# Patient Record
Sex: FEMALE | Race: WHITE | NOT HISPANIC OR LATINO
[De-identification: names, ages, dates, MRNs, and addresses within clinical notes are randomized per-mention and may not be internally consistent; named-entity substitution may affect disease eponyms.]

---

## 2018-09-10 ENCOUNTER — CHARTING TRANS (OUTPATIENT)
Dept: OTHER | Age: 28
End: 2018-09-10

## 2018-12-08 VITALS
WEIGHT: 185.85 LBS | DIASTOLIC BLOOD PRESSURE: 78 MMHG | TEMPERATURE: 98.9 F | BODY MASS INDEX: 35.09 KG/M2 | HEART RATE: 98 BPM | HEIGHT: 61 IN | SYSTOLIC BLOOD PRESSURE: 118 MMHG | RESPIRATION RATE: 18 BRPM

## 2019-08-09 ENCOUNTER — APPOINTMENT (OUTPATIENT)
Dept: OTHER | Facility: HOSPITAL | Age: 29
End: 2019-08-09
Attending: PREVENTIVE MEDICINE

## 2020-08-12 ENCOUNTER — TELEPHONE (OUTPATIENT)
Dept: FAMILY MEDICINE CLINIC | Facility: CLINIC | Age: 30
End: 2020-08-12

## 2020-08-12 ENCOUNTER — OFFICE VISIT (OUTPATIENT)
Dept: FAMILY MEDICINE CLINIC | Facility: CLINIC | Age: 30
End: 2020-08-12
Payer: COMMERCIAL

## 2020-08-12 VITALS
TEMPERATURE: 99 F | HEART RATE: 94 BPM | SYSTOLIC BLOOD PRESSURE: 120 MMHG | BODY MASS INDEX: 37.54 KG/M2 | OXYGEN SATURATION: 98 % | RESPIRATION RATE: 20 BRPM | DIASTOLIC BLOOD PRESSURE: 88 MMHG | WEIGHT: 204 LBS | HEIGHT: 61.75 IN

## 2020-08-12 DIAGNOSIS — R10.10 PAIN OF UPPER ABDOMEN: Primary | ICD-10-CM

## 2020-08-12 LAB
ALBUMIN SERPL-MCNC: 4.1 G/DL (ref 3.4–5)
ALBUMIN/GLOB SERPL: 1.2 {RATIO} (ref 1–2)
ALP LIVER SERPL-CCNC: 88 U/L (ref 37–98)
ALT SERPL-CCNC: 47 U/L (ref 13–56)
AMYLASE SERPL-CCNC: 49 U/L (ref 25–115)
ANION GAP SERPL CALC-SCNC: 5 MMOL/L (ref 0–18)
APPEARANCE: CLEAR
AST SERPL-CCNC: 25 U/L (ref 15–37)
BASOPHILS # BLD AUTO: 0.04 X10(3) UL (ref 0–0.2)
BASOPHILS NFR BLD AUTO: 0.7 %
BILIRUB SERPL-MCNC: 0.4 MG/DL (ref 0.1–2)
BUN BLD-MCNC: 7 MG/DL (ref 7–18)
BUN/CREAT SERPL: 9.1 (ref 10–20)
CALCIUM BLD-MCNC: 9.5 MG/DL (ref 8.5–10.1)
CHLORIDE SERPL-SCNC: 103 MMOL/L (ref 98–112)
CO2 SERPL-SCNC: 29 MMOL/L (ref 21–32)
CONTROL LINE PRESENT WITH A CLEAR BACKGROUND (YES/NO): YES YES/NO
CREAT BLD-MCNC: 0.77 MG/DL (ref 0.55–1.02)
DEPRECATED RDW RBC AUTO: 38 FL (ref 35.1–46.3)
EOSINOPHIL # BLD AUTO: 0.1 X10(3) UL (ref 0–0.7)
EOSINOPHIL NFR BLD AUTO: 1.8 %
ERYTHROCYTE [DISTWIDTH] IN BLOOD BY AUTOMATED COUNT: 11.2 % (ref 11–15)
GLOBULIN PLAS-MCNC: 3.5 G/DL (ref 2.8–4.4)
GLUCOSE BLD-MCNC: 91 MG/DL (ref 70–99)
HCT VFR BLD AUTO: 43.8 % (ref 35–48)
HGB BLD-MCNC: 14.9 G/DL (ref 12–16)
IMM GRANULOCYTES # BLD AUTO: 0.01 X10(3) UL (ref 0–1)
IMM GRANULOCYTES NFR BLD: 0.2 %
KIT LOT #: NORMAL NUMERIC
LIPASE SERPL-CCNC: 121 U/L (ref 73–393)
LYMPHOCYTES # BLD AUTO: 1.38 X10(3) UL (ref 1–4)
LYMPHOCYTES NFR BLD AUTO: 24.8 %
M PROTEIN MFR SERPL ELPH: 7.6 G/DL (ref 6.4–8.2)
MCH RBC QN AUTO: 31.3 PG (ref 26–34)
MCHC RBC AUTO-ENTMCNC: 34 G/DL (ref 31–37)
MCV RBC AUTO: 92 FL (ref 80–100)
MONOCYTES # BLD AUTO: 0.52 X10(3) UL (ref 0.1–1)
MONOCYTES NFR BLD AUTO: 9.3 %
MULTISTIX LOT#: NORMAL NUMERIC
NEUTROPHILS # BLD AUTO: 3.52 X10 (3) UL (ref 1.5–7.7)
NEUTROPHILS # BLD AUTO: 3.52 X10(3) UL (ref 1.5–7.7)
NEUTROPHILS NFR BLD AUTO: 63.2 %
OSMOLALITY SERPL CALC.SUM OF ELEC: 282 MOSM/KG (ref 275–295)
PATIENT FASTING Y/N/NP: NO
PH, URINE: 7 (ref 4.5–8)
PLATELET # BLD AUTO: 390 10(3)UL (ref 150–450)
POTASSIUM SERPL-SCNC: 4 MMOL/L (ref 3.5–5.1)
RBC # BLD AUTO: 4.76 X10(6)UL (ref 3.8–5.3)
SODIUM SERPL-SCNC: 137 MMOL/L (ref 136–145)
SPECIFIC GRAVITY: 1.02 (ref 1–1.03)
URINE-COLOR: YELLOW
UROBILINOGEN,SEMI-QN: 0.2 MG/DL (ref 0–1.9)
WBC # BLD AUTO: 5.6 X10(3) UL (ref 4–11)

## 2020-08-12 PROCEDURE — 80053 COMPREHEN METABOLIC PANEL: CPT | Performed by: NURSE PRACTITIONER

## 2020-08-12 PROCEDURE — 85025 COMPLETE CBC W/AUTO DIFF WBC: CPT | Performed by: NURSE PRACTITIONER

## 2020-08-12 PROCEDURE — 81003 URINALYSIS AUTO W/O SCOPE: CPT | Performed by: NURSE PRACTITIONER

## 2020-08-12 PROCEDURE — 82150 ASSAY OF AMYLASE: CPT | Performed by: NURSE PRACTITIONER

## 2020-08-12 PROCEDURE — 99203 OFFICE O/P NEW LOW 30 MIN: CPT | Performed by: NURSE PRACTITIONER

## 2020-08-12 PROCEDURE — 81025 URINE PREGNANCY TEST: CPT | Performed by: NURSE PRACTITIONER

## 2020-08-12 PROCEDURE — 3074F SYST BP LT 130 MM HG: CPT | Performed by: NURSE PRACTITIONER

## 2020-08-12 PROCEDURE — 3008F BODY MASS INDEX DOCD: CPT | Performed by: NURSE PRACTITIONER

## 2020-08-12 PROCEDURE — 3079F DIAST BP 80-89 MM HG: CPT | Performed by: NURSE PRACTITIONER

## 2020-08-12 PROCEDURE — 83690 ASSAY OF LIPASE: CPT | Performed by: NURSE PRACTITIONER

## 2020-08-12 RX ORDER — MULTIVIT-MIN/IRON FUM/FOLIC AC 7.5 MG-4
1 TABLET ORAL DAILY
COMMUNITY
End: 2021-04-07

## 2020-08-12 RX ORDER — FLUTICASONE PROPIONATE 50 MCG
1 SPRAY, SUSPENSION (ML) NASAL 2 TIMES DAILY
COMMUNITY

## 2020-08-12 RX ORDER — IBUPROFEN 200 MG
200 TABLET ORAL EVERY 6 HOURS PRN
COMMUNITY

## 2020-08-12 RX ORDER — B-COMPLEX WITH VITAMIN C
TABLET ORAL
COMMUNITY
End: 2021-04-07

## 2020-08-12 NOTE — PROGRESS NOTES
[facility] [place]  PROGRESS NOTE  Chief Complaint:   Patient presents with:  Abdominal Pain: more like discomfort x couple weeks, zoe upper quadrants      HPI:   This is a 34year old female coming in for left and right upper quadrant abdominal Oral Tab Take by mouth. • Fluticasone Propionate 50 MCG/ACT Nasal Suspension by Each Nare route daily. • ibuprofen 200 MG Oral Tab Take 200 mg by mouth every 6 (six) hours as needed for Pain.         Counseling given: Not Answered       REVIEW OF SY nourished, no apparent distress. HEART:  Regular rate and rhythm, no murmurs, rubs or gallops. LUNGS: Clear to auscultation bilterally, no rales/rhonchi/wheezing. CHEST: No tenderness.   ABDOMEN:  Soft, nondistended, nontender, bowel sounds normal in all Patient Instructions   Labs today. Start a symptom journal, monitor stress, positions, water intake, dietary intake (soy, dairy, gluten). Push oral fluids. Notify the office if no significant improvement in the next week, pending lab work.   Retu

## 2020-08-12 NOTE — TELEPHONE ENCOUNTER
Patient states for the last 2-3 weeks she has been having upper GI discomfort. States she just feels \"off. \"  Patient denies having any nausea, vomiting, diarrhea, fever, etc.  Patient states food does not make it worse.   [facility]etta [facility] [place] [place] thi

## 2020-08-12 NOTE — TELEPHONE ENCOUNTER
Future Appointments   Date Time Provider Concetta Ya   8/12/2020  4:00 PM SHERLYN Baca [facility] [place] [facility] [place]     + COVID symptom - Abd discomfort (per patient not pain). Also answered yes to SOB on her TS questions.

## 2020-08-12 NOTE — PATIENT INSTRUCTIONS
Labs today. Start a symptom journal, monitor stress, positions, water intake, dietary intake (soy, dairy, gluten). Push oral fluids. Notify the office if no significant improvement in the next week, pending lab work.   Return for routine wellness exam, y

## 2020-08-21 ENCOUNTER — TELEPHONE (OUTPATIENT)
Dept: FAMILY MEDICINE CLINIC | Facility: CLINIC | Age: 30
End: 2020-08-21

## 2020-08-21 NOTE — TELEPHONE ENCOUNTER
There are couple options that we can take at this point. Even though the pain is not midline no associated with nausea vomiting etc. symptoms were not precipitated by pain nor with bowel changes at that time either.   We could try a short course of PPI med

## 2020-08-21 NOTE — TELEPHONE ENCOUNTER
Patient informed of the below recommendations. States she will  a probiotic and Nexium or Prilosec OTC and see if that helps. Patient will also continue with food elimination as she has been.   Patient states she will c/b next week if symptoms pers

## 2020-08-26 ENCOUNTER — TELEPHONE (OUTPATIENT)
Dept: FAMILY MEDICINE CLINIC | Facility: CLINIC | Age: 30
End: 2020-08-26

## 2020-08-26 DIAGNOSIS — R10.10 PAIN OF UPPER ABDOMEN: Primary | ICD-10-CM

## 2020-08-26 NOTE — TELEPHONE ENCOUNTER
CT of abdomen and pelvis ordered with IV and oral contrast.  Check back in 2-3 days to see if authorized, can still schedule but ensure it's authorized first prior to getting test done; if not authorized can proceed with abdominal xray first and then try f

## 2020-08-26 NOTE — TELEPHONE ENCOUNTER
Patient states she has been continuing with the food restriction and taking probiotics and Nexium but she feels like her stomach pain is getting worse. States today after work, she took a nap and when she woke up, she had throbbing pain in her LUQ.   State

## 2020-08-26 NOTE — TELEPHONE ENCOUNTER
Patient informed of the below recommendations. Contact info for [employer] [facility] and [employer] given to patient.

## 2020-09-04 ENCOUNTER — HOSPITAL ENCOUNTER (OUTPATIENT)
Dept: CT IMAGING | Facility: HOSPITAL | Age: 30
Discharge: HOME | End: 2020-09-04
Attending: NURSE PRACTITIONER
Payer: COMMERCIAL

## 2020-09-04 ENCOUNTER — TELEPHONE (OUTPATIENT)
Dept: FAMILY MEDICINE CLINIC | Facility: CLINIC | Age: 30
End: 2020-09-04

## 2020-09-04 DIAGNOSIS — R10.10 PAIN OF UPPER ABDOMEN: ICD-10-CM

## 2020-09-04 PROCEDURE — 74177 CT ABD & PELVIS W/CONTRAST: CPT | Performed by: NURSE PRACTITIONER

## 2020-09-04 NOTE — TELEPHONE ENCOUNTER
Try stopping crystal light packets. Remove gas producing foods from diet at this time. If symptoms ongoing, follow up with physician here in office, has not yet established with a physician (new patient with visit with me).

## 2020-09-04 NOTE — TELEPHONE ENCOUNTER
----- Message from SHERLYN Dempsey sent at 9/4/2020  2:24 PM CDT -----  Please notify the patient her CT is reassuring, no acute abdominal nor pelvic process identified.     1.6cm functional cyst in the left ovary and does show some calcifications in

## 2020-09-04 NOTE — TELEPHONE ENCOUNTER
Patient informed of the below results. Patient states she is still doing weight watcher off/on; not consistently. Patient states she does eat a lot of vegetables. She will occasionally drink pop; twice a month; and she also uses Crystal Light packets.

## 2020-11-02 ENCOUNTER — LAB ENCOUNTER (OUTPATIENT)
Dept: LAB | Age: 30
End: 2020-11-02
Attending: PREVENTIVE MEDICINE

## 2020-11-02 ENCOUNTER — TELEPHONE (OUTPATIENT)
Dept: INTERNAL MEDICINE CLINIC | Facility: HOSPITAL | Age: 30
End: 2020-11-02

## 2020-11-02 DIAGNOSIS — Z20.822 SUSPECTED 2019 NOVEL CORONAVIRUS INFECTION: Primary | ICD-10-CM

## 2020-11-02 DIAGNOSIS — Z20.822 SUSPECTED 2019 NOVEL CORONAVIRUS INFECTION: ICD-10-CM

## 2020-12-19 ENCOUNTER — IMMUNIZATION (OUTPATIENT)
Dept: LAB | Facility: HOSPITAL | Age: 30
End: 2020-12-19
Attending: PREVENTIVE MEDICINE
Payer: COMMERCIAL

## 2020-12-19 DIAGNOSIS — Z23 NEED FOR VACCINATION: ICD-10-CM

## 2020-12-19 PROCEDURE — 0001A PFIZER-BIONTECH COVID-19 VACCINE: CPT

## 2020-12-23 ENCOUNTER — TELEPHONE (OUTPATIENT)
Dept: INTERNAL MEDICINE CLINIC | Facility: HOSPITAL | Age: 30
End: 2020-12-23

## 2020-12-23 NOTE — TELEPHONE ENCOUNTER
COVID vaccine tracker     [x] [facility]   [] VANI   []  [facility]    Department: [facility] [employer]   Date of Vaccine: 12-19-20  Date of Second dose: DUE 1-9-21  Second dose DUE?   Yes      Date of symptom onset: 12-17 and 12-21 Comments:     HA started on 12-17, vaccinated 12-19, congestion and Fatigue on 12-21  Had sign. Work exposures see other note, went to [address] [place] [address] for testing, Rapid neg.   pending- ok to look in Epic per Elma Hopkins

## 2020-12-23 NOTE — TELEPHONE ENCOUNTER
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     HAVE worked? When are you next scheduled to work? Did you have close contact with someone on your unit while not wearing a mask? (e.g., during meal breaks):  Yes []   No []    If yes, who:   Do you share a workspace?  Yes []   No []       If yes, with whom No

## 2020-12-26 NOTE — TELEPHONE ENCOUNTER
Left message to call back- no results found in chart.  Ramses Rivera had a PCR test done at an Immediate Care

## 2021-01-05 NOTE — TELEPHONE ENCOUNTER
Ashlee Nascimento will bring a copy of the results to [facility] this week. Results were negative. Pt has remained asymptomatic.

## 2021-01-09 ENCOUNTER — IMMUNIZATION (OUTPATIENT)
Dept: LAB | Facility: HOSPITAL | Age: 31
End: 2021-01-09
Attending: PREVENTIVE MEDICINE

## 2021-01-09 DIAGNOSIS — Z23 NEED FOR VACCINATION: ICD-10-CM

## 2021-01-09 PROCEDURE — 0002A SARSCOV2 VAC 30MCG/0.3ML IM: CPT

## 2021-01-22 ENCOUNTER — OFFICE VISIT (OUTPATIENT)
Dept: FAMILY MEDICINE CLINIC | Facility: CLINIC | Age: 31
End: 2021-01-22
Payer: COMMERCIAL

## 2021-01-22 ENCOUNTER — LAB ENCOUNTER (OUTPATIENT)
Dept: LAB | Age: 31
End: 2021-01-22
Attending: NURSE PRACTITIONER
Payer: COMMERCIAL

## 2021-01-22 VITALS
SYSTOLIC BLOOD PRESSURE: 118 MMHG | RESPIRATION RATE: 16 BRPM | WEIGHT: 217 LBS | TEMPERATURE: 99 F | HEIGHT: 61 IN | DIASTOLIC BLOOD PRESSURE: 64 MMHG | BODY MASS INDEX: 40.97 KG/M2 | HEART RATE: 108 BPM | OXYGEN SATURATION: 99 %

## 2021-01-22 DIAGNOSIS — Z01.419 WELL WOMAN EXAM WITH ROUTINE GYNECOLOGICAL EXAM: Primary | ICD-10-CM

## 2021-01-22 DIAGNOSIS — E55.9 VITAMIN D DEFICIENCY: ICD-10-CM

## 2021-01-22 LAB
ALBUMIN SERPL-MCNC: 3.8 G/DL (ref 3.4–5)
ALBUMIN/GLOB SERPL: 1.1 {RATIO} (ref 1–2)
ALP LIVER SERPL-CCNC: 98 U/L
ALT SERPL-CCNC: 57 U/L
ANION GAP SERPL CALC-SCNC: 5 MMOL/L (ref 0–18)
AST SERPL-CCNC: 32 U/L (ref 15–37)
BASOPHILS # BLD AUTO: 0.03 X10(3) UL (ref 0–0.2)
BASOPHILS NFR BLD AUTO: 0.6 %
BILIRUB SERPL-MCNC: 0.4 MG/DL (ref 0.1–2)
BUN BLD-MCNC: 10 MG/DL (ref 7–18)
BUN/CREAT SERPL: 13.5 (ref 10–20)
CALCIUM BLD-MCNC: 9.4 MG/DL (ref 8.5–10.1)
CHLORIDE SERPL-SCNC: 106 MMOL/L (ref 98–112)
CHOLEST SMN-MCNC: 227 MG/DL (ref ?–200)
CO2 SERPL-SCNC: 26 MMOL/L (ref 21–32)
CREAT BLD-MCNC: 0.74 MG/DL
DEPRECATED RDW RBC AUTO: 39.4 FL (ref 35.1–46.3)
EOSINOPHIL # BLD AUTO: 0.09 X10(3) UL (ref 0–0.7)
EOSINOPHIL NFR BLD AUTO: 1.7 %
ERYTHROCYTE [DISTWIDTH] IN BLOOD BY AUTOMATED COUNT: 11.5 % (ref 11–15)
GLOBULIN PLAS-MCNC: 3.6 G/DL (ref 2.8–4.4)
GLUCOSE BLD-MCNC: 79 MG/DL (ref 70–99)
HCT VFR BLD AUTO: 45.2 %
HDLC SERPL-MCNC: 86 MG/DL (ref 40–59)
HGB BLD-MCNC: 15 G/DL
IMM GRANULOCYTES # BLD AUTO: 0.02 X10(3) UL (ref 0–1)
IMM GRANULOCYTES NFR BLD: 0.4 %
LDLC SERPL CALC-MCNC: 124 MG/DL (ref ?–100)
LYMPHOCYTES # BLD AUTO: 1.6 X10(3) UL (ref 1–4)
LYMPHOCYTES NFR BLD AUTO: 30.2 %
M PROTEIN MFR SERPL ELPH: 7.4 G/DL (ref 6.4–8.2)
MCH RBC QN AUTO: 31.3 PG (ref 26–34)
MCHC RBC AUTO-ENTMCNC: 33.2 G/DL (ref 31–37)
MCV RBC AUTO: 94.2 FL
MONOCYTES # BLD AUTO: 0.5 X10(3) UL (ref 0.1–1)
MONOCYTES NFR BLD AUTO: 9.4 %
NEUTROPHILS # BLD AUTO: 3.06 X10 (3) UL (ref 1.5–7.7)
NEUTROPHILS # BLD AUTO: 3.06 X10(3) UL (ref 1.5–7.7)
NEUTROPHILS NFR BLD AUTO: 57.7 %
NONHDLC SERPL-MCNC: 141 MG/DL (ref ?–130)
OSMOLALITY SERPL CALC.SUM OF ELEC: 282 MOSM/KG (ref 275–295)
PATIENT FASTING Y/N/NP: YES
PATIENT FASTING Y/N/NP: YES
PLATELET # BLD AUTO: 396 10(3)UL (ref 150–450)
POTASSIUM SERPL-SCNC: 4.1 MMOL/L (ref 3.5–5.1)
RBC # BLD AUTO: 4.8 X10(6)UL
SODIUM SERPL-SCNC: 137 MMOL/L (ref 136–145)
TRIGL SERPL-MCNC: 83 MG/DL (ref 30–149)
TSI SER-ACNC: 1.52 MIU/ML (ref 0.36–3.74)
VIT D+METAB SERPL-MCNC: 34.2 NG/ML (ref 30–100)
VLDLC SERPL CALC-MCNC: 17 MG/DL (ref 0–30)
WBC # BLD AUTO: 5.3 X10(3) UL (ref 4–11)

## 2021-01-22 PROCEDURE — 99395 PREV VISIT EST AGE 18-39: CPT | Performed by: NURSE PRACTITIONER

## 2021-01-22 PROCEDURE — 3078F DIAST BP <80 MM HG: CPT | Performed by: NURSE PRACTITIONER

## 2021-01-22 PROCEDURE — 36415 COLL VENOUS BLD VENIPUNCTURE: CPT | Performed by: NURSE PRACTITIONER

## 2021-01-22 PROCEDURE — 82306 VITAMIN D 25 HYDROXY: CPT | Performed by: NURSE PRACTITIONER

## 2021-01-22 PROCEDURE — 80050 GENERAL HEALTH PANEL: CPT | Performed by: NURSE PRACTITIONER

## 2021-01-22 PROCEDURE — 87660 TRICHOMONAS VAGIN DIR PROBE: CPT | Performed by: NURSE PRACTITIONER

## 2021-01-22 PROCEDURE — 87624 HPV HI-RISK TYP POOLED RSLT: CPT | Performed by: NURSE PRACTITIONER

## 2021-01-22 PROCEDURE — 87510 GARDNER VAG DNA DIR PROBE: CPT | Performed by: NURSE PRACTITIONER

## 2021-01-22 PROCEDURE — 88175 CYTOPATH C/V AUTO FLUID REDO: CPT | Performed by: NURSE PRACTITIONER

## 2021-01-22 PROCEDURE — 87480 CANDIDA DNA DIR PROBE: CPT | Performed by: NURSE PRACTITIONER

## 2021-01-22 PROCEDURE — 3074F SYST BP LT 130 MM HG: CPT | Performed by: NURSE PRACTITIONER

## 2021-01-22 PROCEDURE — 3008F BODY MASS INDEX DOCD: CPT | Performed by: NURSE PRACTITIONER

## 2021-01-22 PROCEDURE — 87591 N.GONORRHOEAE DNA AMP PROB: CPT | Performed by: NURSE PRACTITIONER

## 2021-01-22 PROCEDURE — 87491 CHLMYD TRACH DNA AMP PROBE: CPT | Performed by: NURSE PRACTITIONER

## 2021-01-22 PROCEDURE — 80061 LIPID PANEL: CPT | Performed by: NURSE PRACTITIONER

## 2021-01-22 NOTE — PATIENT INSTRUCTIONS
Fasting labs today. PAP today  Continue with monthly breast self exams, notify the office regarding red flag symptoms as reviewed.   Referral to weight loss clinic

## 2021-01-22 NOTE — PROGRESS NOTES
[facility]    HPI:     Michel Burton is a 27year old female who presents for an Annual Health Visit. Annual Physical, PAP, breast exam.  Is fasting today. Is due for PAP. No prior abnormal PAP smears. Had HPV series. lesions or rashes  EYES: no visual complaints or deficits  HEENT: denies nasal congestion, sinus pain or sore throat; hearing loss negative  RESPIRATORY: denies shortness of breath, wheezing or cough   CARDIOVASCULAR: denies chest pain or FLORENTINO; no palpitati discharge; bimanual exam normal; no adnexal masses or tenderness; cervix non-tender, PAP smear done.    LYMPHATIC: no lymphadenopathy  MUSCULOSKELETAL: no acute synovitis upper or lower extremity  EXTREMITIES: no cyanosis, clubbing or edema, peripheral puls reviewed. Referral to weight loss clinic      The patient indicates understanding of these issues and agrees to the plan. Age appropriate wellness screenings and vaccinations reviewed with patient.      Problem List:  Patient Active Problem List:     BMI

## 2021-01-27 LAB
C TRACH DNA SPEC QL NAA+PROBE: NEGATIVE
HPV I/H RISK 1 DNA SPEC QL NAA+PROBE: NEGATIVE
LAST PAP RESULT: NORMAL
N GONORRHOEA DNA SPEC QL NAA+PROBE: NEGATIVE

## 2021-04-06 NOTE — PROGRESS NOTES
HISTORY OF PRESENT ILLNESS  Patient presents with:  Weight Problem: referred by cousin and PCP Alexandra Akins no previous program    Dearl Camelia is a 27year old female new to our office today for initiation of medical weight loss program.  Patient Depression/anxiety: negative  Glaucoma: negative  Kidney stones: negative  Eating disorder: negative  Migraines/seizures: negative  Joint-related conditions:negative  Liver disease: negative  Thyroid disease: negative  Constipation: negative  Diabetes: n BUN 10 01/22/2021    BUNCREA 13.5 01/22/2021    CREATSERUM 0.74 01/22/2021    ANIONGAP 5 01/22/2021    GFRNAA 109 01/22/2021    GFRAA 126 01/22/2021    CA 9.4 01/22/2021    OSMOCALC 282 01/22/2021    ALKPHO 98 01/22/2021    AST 32 01/22/2021    ALT 57 (H) results given   · BED 7 questionnaire positive   · Reviewed labs, baseline labs ordered  · Decrease carbs, increase protein, no skipping meals   · Needs to incorporate exercise regimen FITTE: ACSM recommendations (150-300 minutes/ week in active weight los

## 2021-04-07 ENCOUNTER — LAB ENCOUNTER (OUTPATIENT)
Dept: LAB | Age: 31
End: 2021-04-07
Attending: NURSE PRACTITIONER
Payer: COMMERCIAL

## 2021-04-07 ENCOUNTER — OFFICE VISIT (OUTPATIENT)
Dept: INTERNAL MEDICINE CLINIC | Facility: CLINIC | Age: 31
End: 2021-04-07
Payer: COMMERCIAL

## 2021-04-07 VITALS
SYSTOLIC BLOOD PRESSURE: 110 MMHG | WEIGHT: 218.19 LBS | HEIGHT: 62 IN | DIASTOLIC BLOOD PRESSURE: 78 MMHG | HEART RATE: 84 BPM | BODY MASS INDEX: 40.15 KG/M2

## 2021-04-07 DIAGNOSIS — R63.2 BINGE EATING: ICD-10-CM

## 2021-04-07 DIAGNOSIS — E66.01 SEVERE OBESITY (BMI >= 40) (HCC): ICD-10-CM

## 2021-04-07 DIAGNOSIS — G47.26 SHIFTING SLEEP-WORK SCHEDULE, AFFECTING SLEEP: ICD-10-CM

## 2021-04-07 DIAGNOSIS — Z51.81 THERAPEUTIC DRUG MONITORING: ICD-10-CM

## 2021-04-07 DIAGNOSIS — Z51.81 THERAPEUTIC DRUG MONITORING: Primary | ICD-10-CM

## 2021-04-07 PROCEDURE — 3078F DIAST BP <80 MM HG: CPT | Performed by: NURSE PRACTITIONER

## 2021-04-07 PROCEDURE — 99214 OFFICE O/P EST MOD 30 MIN: CPT | Performed by: NURSE PRACTITIONER

## 2021-04-07 PROCEDURE — 82607 VITAMIN B-12: CPT

## 2021-04-07 PROCEDURE — 93000 ELECTROCARDIOGRAM COMPLETE: CPT | Performed by: NURSE PRACTITIONER

## 2021-04-07 PROCEDURE — 3008F BODY MASS INDEX DOCD: CPT | Performed by: NURSE PRACTITIONER

## 2021-04-07 PROCEDURE — 83036 HEMOGLOBIN GLYCOSYLATED A1C: CPT

## 2021-04-07 PROCEDURE — 36415 COLL VENOUS BLD VENIPUNCTURE: CPT

## 2021-04-07 PROCEDURE — 3074F SYST BP LT 130 MM HG: CPT | Performed by: NURSE PRACTITIONER

## 2021-04-07 NOTE — PATIENT INSTRUCTIONS
We are here to support you with weight loss, but please remember that you still need your primary care provider for your routine health maintenance.       PLAN:  Will start vyvanse 20mg daily   Follow up with me in 1 month  Schedule follow up appointments: Look at nutrition section-- \"nutrients\" and it will break down your macros for the day (ie. Protein, carbs, fibers, sugars and fats). Try to stay within these numbers daily     2.  \"7 minute workout\" to help with exercise/activity which takes 7 minutes

## 2021-04-20 ENCOUNTER — PATIENT MESSAGE (OUTPATIENT)
Dept: INTERNAL MEDICINE CLINIC | Facility: CLINIC | Age: 31
End: 2021-04-20

## 2021-04-20 DIAGNOSIS — R82.998 DARK URINE: Primary | ICD-10-CM

## 2021-04-20 DIAGNOSIS — R10.84 GENERALIZED ABDOMINAL PAIN: ICD-10-CM

## 2021-04-21 NOTE — TELEPHONE ENCOUNTER
From: Radha Chung  To: SHERLYN Lopez  Sent: 4/20/2021 4:33 PM CDT  Subject: Non-Urgent Medical Question    Hi,  I've noticed my urine has been slightly darker. This has been even before I started the medication.  Can you possibly order a CM

## 2021-04-23 ENCOUNTER — LAB ENCOUNTER (OUTPATIENT)
Dept: LAB | Age: 31
End: 2021-04-23
Attending: PHYSICIAN ASSISTANT
Payer: COMMERCIAL

## 2021-04-23 DIAGNOSIS — R82.998 DARK URINE: ICD-10-CM

## 2021-04-23 DIAGNOSIS — R10.84 GENERALIZED ABDOMINAL PAIN: ICD-10-CM

## 2021-04-23 PROCEDURE — 80053 COMPREHEN METABOLIC PANEL: CPT

## 2021-04-23 PROCEDURE — 36415 COLL VENOUS BLD VENIPUNCTURE: CPT

## 2021-05-04 ENCOUNTER — OFFICE VISIT (OUTPATIENT)
Dept: INTERNAL MEDICINE CLINIC | Facility: CLINIC | Age: 31
End: 2021-05-04
Payer: COMMERCIAL

## 2021-05-04 VITALS
DIASTOLIC BLOOD PRESSURE: 76 MMHG | RESPIRATION RATE: 16 BRPM | SYSTOLIC BLOOD PRESSURE: 118 MMHG | HEIGHT: 62 IN | WEIGHT: 213 LBS | BODY MASS INDEX: 39.2 KG/M2 | HEART RATE: 90 BPM

## 2021-05-04 DIAGNOSIS — R63.2 BINGE EATING: ICD-10-CM

## 2021-05-04 DIAGNOSIS — Z51.81 THERAPEUTIC DRUG MONITORING: Primary | ICD-10-CM

## 2021-05-04 DIAGNOSIS — G47.26 SHIFTING SLEEP-WORK SCHEDULE, AFFECTING SLEEP: ICD-10-CM

## 2021-05-04 PROCEDURE — 3074F SYST BP LT 130 MM HG: CPT | Performed by: NURSE PRACTITIONER

## 2021-05-04 PROCEDURE — 3008F BODY MASS INDEX DOCD: CPT | Performed by: NURSE PRACTITIONER

## 2021-05-04 PROCEDURE — 3078F DIAST BP <80 MM HG: CPT | Performed by: NURSE PRACTITIONER

## 2021-05-04 PROCEDURE — 99214 OFFICE O/P EST MOD 30 MIN: CPT | Performed by: NURSE PRACTITIONER

## 2021-05-04 RX ORDER — BUPROPION HYDROCHLORIDE 150 MG/1
150 TABLET ORAL DAILY
Qty: 30 TABLET | Refills: 2 | Status: SHIPPED | OUTPATIENT
Start: 2021-05-04 | End: 2021-07-27

## 2021-05-04 NOTE — PROGRESS NOTES
HISTORY OF PRESENT ILLNESS  Patient presents with:  Weight Check: down 5 lbs     Queen Martha is a [address] old female here for follow up with medical weight loss program for the treatment of overweight, obesity, or morbid obesity.      Down 5 lbs  Com pink, sclera non icteric, PERRLA  NECK: supple, no adenopathy  LUNGS: CTA in all fields, breathing non labored  CARDIO: RRR without murmur  GI: +BS, NT/ND, no masses or HSM  EXTREMITIES: no cyanosis, no clubbing, no edema  PSYCH: pleasant, cooperative, nor affecting sleep      PLAN   Initial Weight Data and Goal Weight Loss:  Initial consult: # lbs on /2021  Weight Calculations  Initial Weight: 218 lbs  Initial Weight Date: 04/07/21  Today's Weight: 213 lbs  5% Goal: 10.9  10% Goal: 21.8  Total Weight Loss:

## 2021-05-04 NOTE — PATIENT INSTRUCTIONS
We are here to support you with weight loss, but please remember that you still need your primary care provider for your routine health maintenance.       PLAN:  Will increase vyvanse 30mg and add wellbutrin 150mg daily  Follow up with me in 1 month  Schedu ** Daily INPUT> Look at nutrition section-- \"nutrients\" and it will break down your macros for the day (ie. Protein, carbs, fibers, sugars and fats). Try to stay within these numbers daily     2.  \"7 minute workout\" to help with exercise/activit

## 2021-06-02 ENCOUNTER — OFFICE VISIT (OUTPATIENT)
Dept: INTERNAL MEDICINE CLINIC | Facility: CLINIC | Age: 31
End: 2021-06-02
Payer: COMMERCIAL

## 2021-06-02 VITALS
BODY MASS INDEX: 36.62 KG/M2 | HEART RATE: 94 BPM | SYSTOLIC BLOOD PRESSURE: 120 MMHG | HEIGHT: 62 IN | DIASTOLIC BLOOD PRESSURE: 70 MMHG | WEIGHT: 199 LBS | RESPIRATION RATE: 16 BRPM

## 2021-06-02 DIAGNOSIS — Z51.81 THERAPEUTIC DRUG MONITORING: Primary | ICD-10-CM

## 2021-06-02 DIAGNOSIS — R63.2 BINGE EATING: ICD-10-CM

## 2021-06-02 DIAGNOSIS — G47.26 SHIFTING SLEEP-WORK SCHEDULE, AFFECTING SLEEP: ICD-10-CM

## 2021-06-02 DIAGNOSIS — E66.9 OBESITY WITH BODY MASS INDEX (BMI) OF 30.0 TO 39.9: ICD-10-CM

## 2021-06-02 PROCEDURE — 3008F BODY MASS INDEX DOCD: CPT | Performed by: NURSE PRACTITIONER

## 2021-06-02 PROCEDURE — 99213 OFFICE O/P EST LOW 20 MIN: CPT | Performed by: NURSE PRACTITIONER

## 2021-06-02 PROCEDURE — 3074F SYST BP LT 130 MM HG: CPT | Performed by: NURSE PRACTITIONER

## 2021-06-02 PROCEDURE — 3078F DIAST BP <80 MM HG: CPT | Performed by: NURSE PRACTITIONER

## 2021-06-02 NOTE — PROGRESS NOTES
HISTORY OF PRESENT ILLNESS  Patient presents with:  Weight Check: davin Kwon [address][birth year] is a 27year old female here for follow up with medical weight loss program for the treatment of overweight, obesity, or morbid obesity.      Down 14 lbs  Compli in all fields, breathing non labored  CARDIO: RRR without murmur  GI: +BS, NT/ND, no masses or HSM  EXTREMITIES: no cyanosis, no clubbing, no edema  PSYCH: pleasant, cooperative, normal mood and affect    Lab Results   Component Value Date    GLU 94 04/23/ affecting sleep      PLAN   Initial Weight Data and Goal Weight Loss:  Initial consult: #218 lbs on 4/2021  Weight Calculations  Initial Weight: 218 lbs  Initial Weight Date: 04/07/21  Today's Weight: 199 lbs  5% Goal: 10.9  10% Goal: 21.8  Total Weight Lo vegetables/day  iii. Avoid skipping meals- eat every 4-5 hours  iv. Aim for 3 meals/day  2. Drink lots of water and cut down on soda/juice consumption if soda/juice drinker  3.  Focus on protein: (15-30 grams with each meal) ie. greek yogurt, cottage cheese eggs  -natural cheeses  -nuts (measure portion size)   -unsweetened nut butters  -dried edamame   -eufemia seeds soaked in water or almond milk  -soy nuts  -cured meats (monitor for sodium issues)   -hummus with vegetables  -bean dip with vegetables     FRUIT

## 2021-06-02 NOTE — PATIENT INSTRUCTIONS
We are here to support you with weight loss, but please remember that you still need your primary care provider for your routine health maintenance.       PLAN:  Will continue with vyvanse and wellbutrin   Follow up with me in 1 month  Schedule follow up ap Daily INPUT> Look at nutrition section-- \"nutrients\" and it will break down your macros for the day (ie. Protein, carbs, fibers, sugars and fats). Try to stay within these numbers daily     2.  \"7 minute workout\" to help with exercise/activity which enrique

## 2021-07-10 ENCOUNTER — PATIENT MESSAGE (OUTPATIENT)
Dept: INTERNAL MEDICINE CLINIC | Facility: CLINIC | Age: 31
End: 2021-07-10

## 2021-07-10 DIAGNOSIS — E66.9 OBESITY WITH BODY MASS INDEX (BMI) OF 30.0 TO 39.9: ICD-10-CM

## 2021-07-10 DIAGNOSIS — R63.2 BINGE EATING: ICD-10-CM

## 2021-07-10 DIAGNOSIS — Z51.81 THERAPEUTIC DRUG MONITORING: ICD-10-CM

## 2021-07-11 NOTE — TELEPHONE ENCOUNTER
Requesting Vyvanse 30 mg  LOV: 6/2/21  RTC: one month  Last Relevant Labs: na  Filled: 6/2/21 #30 with 0 refills  [place]    Future Appointments   Date Time Provider Concetta Ya   7/27/2021  9:40 AM Opal Woo

## 2021-07-11 NOTE — TELEPHONE ENCOUNTER
From: Nicolasa Rubi  To: SHERLYN Guadarrama  Sent: 7/10/2021 5:28 PM CDT  Subject: Prescription Question    Hi there!  I wasn't able to get an appt with you until later this month so I was wondering if you could renew my vyvanse prescription to my

## 2021-07-27 ENCOUNTER — OFFICE VISIT (OUTPATIENT)
Dept: INTERNAL MEDICINE CLINIC | Facility: CLINIC | Age: 31
End: 2021-07-27
Payer: COMMERCIAL

## 2021-07-27 VITALS
WEIGHT: 186 LBS | SYSTOLIC BLOOD PRESSURE: 118 MMHG | DIASTOLIC BLOOD PRESSURE: 74 MMHG | BODY MASS INDEX: 34.23 KG/M2 | HEART RATE: 96 BPM | HEIGHT: 62 IN | OXYGEN SATURATION: 98 % | RESPIRATION RATE: 17 BRPM

## 2021-07-27 DIAGNOSIS — R63.2 BINGE EATING: ICD-10-CM

## 2021-07-27 DIAGNOSIS — Z51.81 THERAPEUTIC DRUG MONITORING: Primary | ICD-10-CM

## 2021-07-27 DIAGNOSIS — G47.26 SHIFTING SLEEP-WORK SCHEDULE, AFFECTING SLEEP: ICD-10-CM

## 2021-07-27 DIAGNOSIS — E66.9 OBESITY WITH BODY MASS INDEX (BMI) OF 30.0 TO 39.9: ICD-10-CM

## 2021-07-27 PROCEDURE — 3008F BODY MASS INDEX DOCD: CPT | Performed by: NURSE PRACTITIONER

## 2021-07-27 PROCEDURE — 3078F DIAST BP <80 MM HG: CPT | Performed by: NURSE PRACTITIONER

## 2021-07-27 PROCEDURE — 99213 OFFICE O/P EST LOW 20 MIN: CPT | Performed by: NURSE PRACTITIONER

## 2021-07-27 PROCEDURE — 3074F SYST BP LT 130 MM HG: CPT | Performed by: NURSE PRACTITIONER

## 2021-07-27 RX ORDER — BUPROPION HYDROCHLORIDE 150 MG/1
150 TABLET ORAL DAILY
Qty: 90 TABLET | Refills: 1 | Status: SHIPPED | OUTPATIENT
Start: 2021-07-27

## 2021-07-27 NOTE — PATIENT INSTRUCTIONS
We are here to support you with weight loss, but please remember that you still need your primary care provider for your routine health maintenance.       PLAN:  Will continue with vyvanse 30mg and wellbutin daily   Follow up with me in 1-2 month  Schedule ** Daily INPUT> Look at nutrition section-- \"nutrients\" and it will break down your macros for the day (ie. Protein, carbs, fibers, sugars and fats). Try to stay within these numbers daily     2.  \"7 minute workout\" to help with exercise/activity w

## 2021-07-27 NOTE — PROGRESS NOTES
HISTORY OF PRESENT ILLNESS  Patient presents with:  Weight Check: down [address] is a 27year old female here for follow up with medical weight loss program for the treatment of overweight, obesity, or morbid obesity.      Down 13 lbs  Compli conjunctiva pink, sclera non icteric, PERRLA  NECK: supple, no adenopathy  LUNGS: CTA in all fields, breathing non labored  CARDIO: RRR without murmur  GI: +BS, NT/ND, no masses or HSM  EXTREMITIES: no cyanosis, no clubbing, no edema  PSYCH: pleasant, coop Hr    (E66.9) Obesity with body mass index (BMI) of 30.0 to 39.9  Plan: Lisdexamfetamine Dimesylate (VYVANSE) 30 MG         Oral Cap, Lisdexamfetamine Dimesylate (VYVANSE)        30 MG Oral Cap, Lisdexamfetamine Dimesylate         (VYVANSE) 30 MG Oral Cap file for this visit. No follow-ups on file. Patient verbalizes understanding.     SHERLYN Pacheco

## 2021-11-11 ENCOUNTER — IMMUNIZATION (OUTPATIENT)
Dept: LAB | Facility: HOSPITAL | Age: 31
End: 2021-11-11
Attending: EMERGENCY MEDICINE
Payer: COMMERCIAL

## 2021-11-11 DIAGNOSIS — Z23 NEED FOR VACCINATION: Primary | ICD-10-CM

## 2021-11-11 PROCEDURE — 0004A SARSCOV2 VAC 30MCG/0.3ML IM: CPT

## 2021-11-18 ENCOUNTER — OFFICE VISIT (OUTPATIENT)
Dept: INTERNAL MEDICINE CLINIC | Facility: CLINIC | Age: 31
End: 2021-11-18
Payer: COMMERCIAL

## 2021-11-18 VITALS
DIASTOLIC BLOOD PRESSURE: 82 MMHG | BODY MASS INDEX: 32.94 KG/M2 | HEART RATE: 85 BPM | HEIGHT: 62 IN | WEIGHT: 179 LBS | OXYGEN SATURATION: 98 % | SYSTOLIC BLOOD PRESSURE: 120 MMHG

## 2021-11-18 DIAGNOSIS — E66.9 OBESITY WITH BODY MASS INDEX (BMI) OF 30.0 TO 39.9: ICD-10-CM

## 2021-11-18 DIAGNOSIS — Z51.81 THERAPEUTIC DRUG MONITORING: Primary | ICD-10-CM

## 2021-11-18 DIAGNOSIS — G47.26 SHIFTING SLEEP-WORK SCHEDULE, AFFECTING SLEEP: ICD-10-CM

## 2021-11-18 DIAGNOSIS — R63.2 BINGE EATING: ICD-10-CM

## 2021-11-18 PROCEDURE — 3074F SYST BP LT 130 MM HG: CPT | Performed by: NURSE PRACTITIONER

## 2021-11-18 PROCEDURE — 3079F DIAST BP 80-89 MM HG: CPT | Performed by: NURSE PRACTITIONER

## 2021-11-18 PROCEDURE — 3008F BODY MASS INDEX DOCD: CPT | Performed by: NURSE PRACTITIONER

## 2021-11-18 PROCEDURE — 99213 OFFICE O/P EST LOW 20 MIN: CPT | Performed by: NURSE PRACTITIONER

## 2021-11-18 NOTE — PATIENT INSTRUCTIONS
We are here to support you with weight loss, but please remember that you still need your primary care provider for your routine health maintenance.       PLAN:  Will increases vyvanse 40mg and wellbutrin 150mg  Check out premade meal options  Follow up wit number per day)                   ** Daily INPUT> Look at nutrition section-- \"nutrients\" and it will break down your macros for the day (ie. Protein, carbs, fibers, sugars and fats). Try to stay within these numbers daily     2.  \"7 minute workout\" to

## 2021-11-18 NOTE — PROGRESS NOTES
HISTORY OF PRESENT ILLNESS  Patient presents with:  Weight Check: down 7    Queen Martha is a 32year old female here for follow up with medical weight loss program for the treatment of overweight, obesity, or morbid obesity.      Down 7 lbs  Complian adenopathy  LUNGS: CTA in all fields, breathing non labored  CARDIO: RRR without murmur  GI: +BS, NT/ND, no masses or HSM  EXTREMITIES: no cyanosis, no clubbing, no edema  PSYCH: pleasant, cooperative, normal mood and affect    Lab Results   Component Valu Loss:  Initial consult: # 218 lbs on 4/2021  Weight Calculations  Initial Weight: 218 lbs  Initial Weight Date: 04/07/21  Today's Weight: 179 lbs  5% Goal: 10.9  10% Goal: 21.8  Total Weight Loss: 39 lbs  Total weight loss: Down 7 lbs total, Net loss 39 lb

## 2021-12-10 DIAGNOSIS — Z51.81 THERAPEUTIC DRUG MONITORING: ICD-10-CM

## 2021-12-10 RX ORDER — BUPROPION HYDROCHLORIDE 150 MG/1
TABLET ORAL
Qty: 90 TABLET | Refills: 1 | OUTPATIENT
Start: 2021-12-10

## 2021-12-10 NOTE — TELEPHONE ENCOUNTER
Requesting  Requested Prescriptions     Pending Prescriptions Disp Refills   • BUPROPION 150 MG Oral Tablet 24 Hr [Pharmacy Med Name: BUPROPION HCL  MG TABLET] 90 tablet 1     Sig: TAKE 1 TABLET BY MOUTH EVERY DAY     LOV: 11/18/21  RTC: 8 weeks  Azael

## 2022-02-14 ENCOUNTER — OFFICE VISIT (OUTPATIENT)
Dept: INTERNAL MEDICINE CLINIC | Facility: CLINIC | Age: 32
End: 2022-02-14
Payer: COMMERCIAL

## 2022-02-14 VITALS
WEIGHT: 174 LBS | DIASTOLIC BLOOD PRESSURE: 80 MMHG | OXYGEN SATURATION: 99 % | SYSTOLIC BLOOD PRESSURE: 118 MMHG | HEIGHT: 62 IN | HEART RATE: 87 BPM | RESPIRATION RATE: 16 BRPM | BODY MASS INDEX: 32.02 KG/M2

## 2022-02-14 DIAGNOSIS — Z51.81 THERAPEUTIC DRUG MONITORING: Primary | ICD-10-CM

## 2022-02-14 DIAGNOSIS — G47.26 SHIFTING SLEEP-WORK SCHEDULE, AFFECTING SLEEP: ICD-10-CM

## 2022-02-14 DIAGNOSIS — E66.9 OBESITY WITH BODY MASS INDEX (BMI) OF 30.0 TO 39.9: ICD-10-CM

## 2022-02-14 DIAGNOSIS — R63.2 BINGE EATING: ICD-10-CM

## 2022-02-14 PROCEDURE — 99214 OFFICE O/P EST MOD 30 MIN: CPT | Performed by: NURSE PRACTITIONER

## 2022-02-14 PROCEDURE — 3008F BODY MASS INDEX DOCD: CPT | Performed by: NURSE PRACTITIONER

## 2022-02-14 PROCEDURE — 3074F SYST BP LT 130 MM HG: CPT | Performed by: NURSE PRACTITIONER

## 2022-02-14 PROCEDURE — 3079F DIAST BP 80-89 MM HG: CPT | Performed by: NURSE PRACTITIONER

## 2022-02-14 NOTE — PATIENT INSTRUCTIONS
We are here to support you with weight loss, but please remember that you still need your primary care provider for your routine health maintenance. PLAN:  Will continue with vyvanse 40mg   Continue with exercise and strength training   Follow up with me in 8-12 weeks  Schedule follow up appointments: Rosaline Malave (dietitian) or Ryann Lopez (presurgery dietitian)   Check for insurance coverage for dietitian and labwork prior to scheduling appointment. Please try to work on the following dietary changes:  1. Goals: Aim for 20-30 grams of protein/ meal  i. Aim for 100 grams of carbohydrates/day  ii. Eat 4-6 vegetables/day  iii. Avoid skipping meals- eat every 4-5 hours  iv. Aim for 3 meals/day  2. Drink lots of water and cut down on soda/juice consumption if soda/juice drinker  3. Focus on protein: (15-30 grams with each meal) ie. greek yogurt, cottage cheese, string cheese, hard boiled eggs  4. Healthy snacks: peanut butter and apples, hummus and carrots, berries, nuts (1/4 cup), tuna and crackers                 Protein Shakes: Premier protein or Core Power                Protein Bars: Rx Bars, Oatmega, Power Crunch                 Sargento balanced breaks (cheese and nuts)- without chocolate  5. Reduce carbohydrates which includes sweets as well as rice, pasta, potatoes, bread, corn and instead choose whole grain options or more protein or vegetables (4-6 servings of vegetables per day)  6. Get a good night of sleep  7. Try to decrease stress in life     Please download apps:  1. \"My Fitness Pal\" (other option is Lose it)) to help you to monitor daily dietary intake and you will be able to see if you are eating the right amount of calories, protein, carbs                With My Fitness Pal-->When you set-up the nickie or need to adjust settings:                Goals should include:                  Lose 1.5-2 lbs per week                Activity level: not very active (can't count exercise towards calorie number per day)                   ** Daily INPUT> Look at nutrition section-- \"nutrients\" and it will break down your macros for the day (ie. Protein, carbs, fibers, sugars and fats). Try to stay within these numbers daily     2. \"7 minute workout\" to help with exercise/activity which takes 7 minutes of your day and that you can do at home! 3. \"Calm\" or \"Headspace\" which helps with mindfulness, meditation, clarity, sleep, and kristal to your daily life. 4. [employer] blog for healthy recipe ideas  5. GoodClic for low carb resources    HIGH PROTEIN SNACK IDEAS  -cottage cheese  -plain yogurt  -kefir  -hard-boiled eggs  -natural cheeses  -nuts (measure portion size)   -unsweetened nut butters  -dried edamame   -eufemia seeds soaked in water or almond milk  -soy nuts  -cured meats (monitor for sodium issues)   -hummus with vegetables  -bean dip with vegetables     FRUIT  Low carb fruit options   Raspberries: Half a cup (60 grams) contains 3 grams of carbs. Blackberries: Half a cup (70 grams) contains 4 grams of carbs. Strawberries: Half a cup (100 grams) contains 6 grams of carbs. Blueberries: Half a cup (50 grams) contains 6 grams of carbs. Plum: One medium-sized (80 grams) contains 6 grams of carbs.      VEGETABLES  Low carb vegetables

## 2022-04-18 RX ORDER — BUPROPION HYDROCHLORIDE 150 MG/1
TABLET ORAL
Qty: 90 TABLET | Refills: 1 | OUTPATIENT
Start: 2022-04-18

## 2022-05-02 ENCOUNTER — TELEMEDICINE (OUTPATIENT)
Dept: FAMILY MEDICINE CLINIC | Facility: CLINIC | Age: 32
End: 2022-05-02
Payer: COMMERCIAL

## 2022-05-02 ENCOUNTER — PATIENT MESSAGE (OUTPATIENT)
Dept: FAMILY MEDICINE CLINIC | Facility: CLINIC | Age: 32
End: 2022-05-02

## 2022-05-02 VITALS — BODY MASS INDEX: 31.47 KG/M2 | WEIGHT: 171 LBS | HEIGHT: 62 IN

## 2022-05-02 DIAGNOSIS — L30.9 DERMATITIS: Primary | ICD-10-CM

## 2022-05-02 PROCEDURE — 99213 OFFICE O/P EST LOW 20 MIN: CPT | Performed by: NURSE PRACTITIONER

## 2022-05-02 PROCEDURE — 3008F BODY MASS INDEX DOCD: CPT | Performed by: NURSE PRACTITIONER

## 2022-05-02 RX ORDER — PREDNISONE 10 MG/1
TABLET ORAL
Qty: 18 TABLET | Refills: 0 | Status: SHIPPED | OUTPATIENT
Start: 2022-05-02 | End: 2022-05-11

## 2022-05-02 NOTE — PATIENT INSTRUCTIONS
Prednisone taper as ordered; side effects reviewed, take with food  Zyrtec 10mg daily; can make you sleepy  Benadryl 50mg every 6 hours if needed; can make you sleepy  Hydrocortisone 10 one application two to three times a day if needed  Keep nails short, avoid scratching  Unscented soaps; dove sensitive skin    Will see for Physical tomorrow

## 2022-05-12 ENCOUNTER — OFFICE VISIT (OUTPATIENT)
Dept: FAMILY MEDICINE CLINIC | Facility: CLINIC | Age: 32
End: 2022-05-12
Payer: COMMERCIAL

## 2022-05-12 ENCOUNTER — LAB ENCOUNTER (OUTPATIENT)
Dept: LAB | Age: 32
End: 2022-05-12
Attending: NURSE PRACTITIONER
Payer: COMMERCIAL

## 2022-05-12 ENCOUNTER — TELEPHONE (OUTPATIENT)
Dept: FAMILY MEDICINE CLINIC | Facility: CLINIC | Age: 32
End: 2022-05-12

## 2022-05-12 VITALS
RESPIRATION RATE: 18 BRPM | SYSTOLIC BLOOD PRESSURE: 116 MMHG | HEART RATE: 77 BPM | DIASTOLIC BLOOD PRESSURE: 76 MMHG | BODY MASS INDEX: 31.94 KG/M2 | TEMPERATURE: 98 F | WEIGHT: 178 LBS | HEIGHT: 62.6 IN | OXYGEN SATURATION: 100 %

## 2022-05-12 DIAGNOSIS — Z00.00 ENCOUNTER FOR ANNUAL HEALTH EXAMINATION: ICD-10-CM

## 2022-05-12 DIAGNOSIS — Z00.00 ENCOUNTER FOR ANNUAL HEALTH EXAMINATION: Primary | ICD-10-CM

## 2022-05-12 DIAGNOSIS — E66.9 OBESITY WITH BODY MASS INDEX (BMI) OF 30.0 TO 39.9: ICD-10-CM

## 2022-05-12 LAB
ALBUMIN SERPL-MCNC: 3.5 G/DL (ref 3.4–5)
ALBUMIN/GLOB SERPL: 1.3 {RATIO} (ref 1–2)
ALP LIVER SERPL-CCNC: 64 U/L
ALT SERPL-CCNC: 27 U/L
ANION GAP SERPL CALC-SCNC: 5 MMOL/L (ref 0–18)
AST SERPL-CCNC: 14 U/L (ref 15–37)
BASOPHILS # BLD AUTO: 0.05 X10(3) UL (ref 0–0.2)
BASOPHILS NFR BLD AUTO: 0.7 %
BILIRUB SERPL-MCNC: 0.4 MG/DL (ref 0.1–2)
BUN BLD-MCNC: 9 MG/DL (ref 7–18)
CALCIUM BLD-MCNC: 9 MG/DL (ref 8.5–10.1)
CHLORIDE SERPL-SCNC: 106 MMOL/L (ref 98–112)
CHOLEST SERPL-MCNC: 186 MG/DL (ref ?–200)
CO2 SERPL-SCNC: 27 MMOL/L (ref 21–32)
CREAT BLD-MCNC: 0.67 MG/DL
EOSINOPHIL # BLD AUTO: 0.11 X10(3) UL (ref 0–0.7)
EOSINOPHIL NFR BLD AUTO: 1.5 %
ERYTHROCYTE [DISTWIDTH] IN BLOOD BY AUTOMATED COUNT: 11.5 %
FASTING PATIENT LIPID ANSWER: YES
FASTING STATUS PATIENT QL REPORTED: YES
GLOBULIN PLAS-MCNC: 2.8 G/DL (ref 2.8–4.4)
GLUCOSE BLD-MCNC: 74 MG/DL (ref 70–99)
HCT VFR BLD AUTO: 42.8 %
HDLC SERPL-MCNC: 82 MG/DL (ref 40–59)
HGB BLD-MCNC: 13.9 G/DL
IMM GRANULOCYTES # BLD AUTO: 0.02 X10(3) UL (ref 0–1)
IMM GRANULOCYTES NFR BLD: 0.3 %
LDLC SERPL CALC-MCNC: 88 MG/DL (ref ?–100)
LYMPHOCYTES # BLD AUTO: 2.93 X10(3) UL (ref 1–4)
LYMPHOCYTES NFR BLD AUTO: 38.7 %
MCH RBC QN AUTO: 31.4 PG (ref 26–34)
MCHC RBC AUTO-ENTMCNC: 32.5 G/DL (ref 31–37)
MCV RBC AUTO: 96.6 FL
MONOCYTES # BLD AUTO: 0.66 X10(3) UL (ref 0.1–1)
MONOCYTES NFR BLD AUTO: 8.7 %
NEUTROPHILS # BLD AUTO: 3.8 X10 (3) UL (ref 1.5–7.7)
NEUTROPHILS # BLD AUTO: 3.8 X10(3) UL (ref 1.5–7.7)
NEUTROPHILS NFR BLD AUTO: 50.1 %
NONHDLC SERPL-MCNC: 104 MG/DL (ref ?–130)
OSMOLALITY SERPL CALC.SUM OF ELEC: 283 MOSM/KG (ref 275–295)
PLATELET # BLD AUTO: 352 10(3)UL (ref 150–450)
POTASSIUM SERPL-SCNC: 4.5 MMOL/L (ref 3.5–5.1)
PROT SERPL-MCNC: 6.3 G/DL (ref 6.4–8.2)
RBC # BLD AUTO: 4.43 X10(6)UL
SODIUM SERPL-SCNC: 138 MMOL/L (ref 136–145)
TRIGL SERPL-MCNC: 91 MG/DL (ref 30–149)
TSI SER-ACNC: 1.63 MIU/ML (ref 0.36–3.74)
VLDLC SERPL CALC-MCNC: 15 MG/DL (ref 0–30)
WBC # BLD AUTO: 7.6 X10(3) UL (ref 4–11)

## 2022-05-12 PROCEDURE — 99395 PREV VISIT EST AGE 18-39: CPT | Performed by: NURSE PRACTITIONER

## 2022-05-12 PROCEDURE — 36415 COLL VENOUS BLD VENIPUNCTURE: CPT

## 2022-05-12 PROCEDURE — 85025 COMPLETE CBC W/AUTO DIFF WBC: CPT

## 2022-05-12 PROCEDURE — 3074F SYST BP LT 130 MM HG: CPT | Performed by: NURSE PRACTITIONER

## 2022-05-12 PROCEDURE — 3078F DIAST BP <80 MM HG: CPT | Performed by: NURSE PRACTITIONER

## 2022-05-12 PROCEDURE — 3008F BODY MASS INDEX DOCD: CPT | Performed by: NURSE PRACTITIONER

## 2022-05-12 PROCEDURE — 80053 COMPREHEN METABOLIC PANEL: CPT

## 2022-05-12 PROCEDURE — 84443 ASSAY THYROID STIM HORMONE: CPT

## 2022-05-12 PROCEDURE — 80061 LIPID PANEL: CPT

## 2022-05-12 NOTE — TELEPHONE ENCOUNTER
Spoke to pt advised of lab results below. Advised pt results are available on mychart. Pt understands and is agreeable.

## 2022-05-12 NOTE — TELEPHONE ENCOUNTER
----- Message from SHERLYN Phillips sent at 5/12/2022  4:37 PM CDT -----  ZenDealst message sent. Mildly out of range protein otherwise labs look great. Increase protein intake, otherwise recheck in one year.

## 2022-05-12 NOTE — PATIENT INSTRUCTIONS
Fasting labs today  Continue routine follow up with weight loss clinic  Continue your three miles a day, great job!    Await lab results  Physical in one year otherwise

## 2022-08-16 LAB — AMB EXT COVID-19 RESULT: DETECTED

## 2022-08-17 ENCOUNTER — PATIENT MESSAGE (OUTPATIENT)
Dept: FAMILY MEDICINE CLINIC | Facility: CLINIC | Age: 32
End: 2022-08-17

## 2022-08-17 NOTE — TELEPHONE ENCOUNTER
Future Appointments   Date Time Provider Concetta Ya   8/18/2022 11:30 AM SHERLYN Fraire [facility] [place] [facility] [place]

## 2022-08-18 ENCOUNTER — TELEPHONE (OUTPATIENT)
Dept: INTERNAL MEDICINE CLINIC | Facility: HOSPITAL | Age: 32
End: 2022-08-18

## 2022-08-18 ENCOUNTER — TELEMEDICINE (OUTPATIENT)
Dept: FAMILY MEDICINE CLINIC | Facility: CLINIC | Age: 32
End: 2022-08-18
Payer: COMMERCIAL

## 2022-08-18 VITALS — HEIGHT: 62.5 IN | BODY MASS INDEX: 32.65 KG/M2 | WEIGHT: 182 LBS | TEMPERATURE: 98 F

## 2022-08-18 DIAGNOSIS — U07.1 COVID-19: Primary | ICD-10-CM

## 2022-08-18 RX ORDER — ALBUTEROL SULFATE 90 UG/1
2 AEROSOL, METERED RESPIRATORY (INHALATION) EVERY 6 HOURS PRN
Qty: 1 EACH | Refills: 0 | Status: SHIPPED | OUTPATIENT
Start: 2022-08-18

## 2022-08-18 RX ORDER — BENZONATATE 100 MG/1
100 CAPSULE ORAL 3 TIMES DAILY PRN
Qty: 15 CAPSULE | Refills: 0 | Status: SHIPPED | OUTPATIENT
Start: 2022-08-18

## 2022-08-18 NOTE — PATIENT INSTRUCTIONS
Continue symptomatic care and isolation.   Deep breathing exercises, prone positioning  Albuterol 2 puffs every 4 hours if needed; has tolerated in the past  Tessalon 1 every 8 hours if needed for cough  Fluids, rest  Steam, humidifier  Continue flonase  Alternate acetaminophen and ibuprofen if needed  ER for severe symptoms or oxygen sustaining 92% or below

## 2022-08-23 ENCOUNTER — PATIENT MESSAGE (OUTPATIENT)
Dept: FAMILY MEDICINE CLINIC | Facility: CLINIC | Age: 32
End: 2022-08-23

## 2022-08-23 DIAGNOSIS — U07.1 COVID-19: ICD-10-CM

## 2022-08-23 RX ORDER — BENZONATATE 100 MG/1
100 CAPSULE ORAL 3 TIMES DAILY PRN
Qty: 15 CAPSULE | Refills: 0 | Status: SHIPPED | OUTPATIENT
Start: 2022-08-23

## 2022-08-23 RX ORDER — ONDANSETRON HYDROCHLORIDE 8 MG/1
8 TABLET, FILM COATED ORAL EVERY 8 HOURS PRN
Qty: 15 TABLET | Refills: 0 | Status: SHIPPED | OUTPATIENT
Start: 2022-08-23

## 2022-10-26 ENCOUNTER — ULTRASOUND ENCOUNTER (OUTPATIENT)
Dept: OBGYN CLINIC | Facility: CLINIC | Age: 32
End: 2022-10-26
Payer: COMMERCIAL

## 2022-10-26 ENCOUNTER — OFFICE VISIT (OUTPATIENT)
Dept: OBGYN CLINIC | Facility: CLINIC | Age: 32
End: 2022-10-26
Payer: COMMERCIAL

## 2022-10-26 VITALS
WEIGHT: 188.81 LBS | SYSTOLIC BLOOD PRESSURE: 128 MMHG | DIASTOLIC BLOOD PRESSURE: 72 MMHG | BODY MASS INDEX: 35.19 KG/M2 | HEIGHT: 61.5 IN

## 2022-10-26 DIAGNOSIS — N91.1 SECONDARY AMENORRHEA: Primary | ICD-10-CM

## 2022-10-26 DIAGNOSIS — Z32.01 PREGNANCY EXAMINATION OR TEST, POSITIVE RESULT: ICD-10-CM

## 2022-10-26 PROCEDURE — 99203 OFFICE O/P NEW LOW 30 MIN: CPT | Performed by: OBSTETRICS & GYNECOLOGY

## 2022-10-26 PROCEDURE — 76830 TRANSVAGINAL US NON-OB: CPT | Performed by: OBSTETRICS & GYNECOLOGY

## 2022-10-26 PROCEDURE — 3074F SYST BP LT 130 MM HG: CPT | Performed by: OBSTETRICS & GYNECOLOGY

## 2022-10-26 PROCEDURE — 3078F DIAST BP <80 MM HG: CPT | Performed by: OBSTETRICS & GYNECOLOGY

## 2022-10-26 PROCEDURE — 3008F BODY MASS INDEX DOCD: CPT | Performed by: OBSTETRICS & GYNECOLOGY

## 2022-10-26 RX ORDER — AZELAIC ACID 0.15 G/G
GEL TOPICAL
COMMUNITY
Start: 2022-09-03

## 2022-11-15 ENCOUNTER — IMMUNIZATION (OUTPATIENT)
Dept: LAB | Facility: HOSPITAL | Age: 32
End: 2022-11-15
Attending: PREVENTIVE MEDICINE
Payer: COMMERCIAL

## 2022-11-15 DIAGNOSIS — Z23 NEED FOR VACCINATION: Primary | ICD-10-CM

## 2022-11-15 PROCEDURE — 90471 IMMUNIZATION ADMIN: CPT

## 2022-11-21 ENCOUNTER — INITIAL PRENATAL (OUTPATIENT)
Dept: OBGYN CLINIC | Facility: CLINIC | Age: 32
End: 2022-11-21
Payer: COMMERCIAL

## 2022-11-21 VITALS
HEIGHT: 61 IN | SYSTOLIC BLOOD PRESSURE: 108 MMHG | DIASTOLIC BLOOD PRESSURE: 62 MMHG | BODY MASS INDEX: 35.68 KG/M2 | WEIGHT: 189 LBS

## 2022-11-21 DIAGNOSIS — Z34.01 ENCOUNTER FOR SUPERVISION OF NORMAL FIRST PREGNANCY IN FIRST TRIMESTER: Primary | ICD-10-CM

## 2022-11-21 DIAGNOSIS — E66.9 OBESITY WITH BODY MASS INDEX (BMI) OF 30.0 TO 39.9: ICD-10-CM

## 2022-11-21 LAB
GLUCOSE (URINE DIPSTICK): NEGATIVE MG/DL
MULTISTIX LOT#: NORMAL NUMERIC
PROTEIN (URINE DIPSTICK): NEGATIVE MG/DL

## 2022-11-21 PROCEDURE — 3078F DIAST BP <80 MM HG: CPT | Performed by: NURSE PRACTITIONER

## 2022-11-21 PROCEDURE — 87491 CHLMYD TRACH DNA AMP PROBE: CPT | Performed by: NURSE PRACTITIONER

## 2022-11-21 PROCEDURE — 3074F SYST BP LT 130 MM HG: CPT | Performed by: NURSE PRACTITIONER

## 2022-11-21 PROCEDURE — 3008F BODY MASS INDEX DOCD: CPT | Performed by: NURSE PRACTITIONER

## 2022-11-21 PROCEDURE — 87086 URINE CULTURE/COLONY COUNT: CPT | Performed by: NURSE PRACTITIONER

## 2022-11-21 PROCEDURE — 87591 N.GONORRHOEAE DNA AMP PROB: CPT | Performed by: NURSE PRACTITIONER

## 2022-11-21 PROCEDURE — 81002 URINALYSIS NONAUTO W/O SCOPE: CPT | Performed by: NURSE PRACTITIONER

## 2022-11-21 NOTE — PROGRESS NOTES
Here for initial prenatal visit with our group. 28year old  at [facility] by first trimester ultrasound. Patient's last menstrual period was 2022. Last pap smear was 2021 and it was normal.     OB History    Para Term  AB Living   1             SAB IAB Ectopic Multiple Live Births                  # Outcome Date GA Lbr Evens/2nd Weight Sex Delivery Anes PTL Lv   1 Current                ROS:  Reviewed, all wnl    Please see prenatal physical exam tab for initial OB physical exam  US: please see US tab    Prenatal course and care discussed with patient: visits, labs, HIV, AFP, sonograms, GL, GBS, restrictions. Pamphlets given. Genetic counseling done at prior visit regarding carrier screening for Cystic fibrosis, First trimester screen/NT screen, CVS, QS + midtrimester sonogram for markers, amniocentesis. Pt would like the MaterniT 21. She is aware to complete at the [facility] lab location. A/P:  1. Encounter for supervision of normal first pregnancy in first trimester  - URINALYSIS NONAUTO W/O SCOPE (OB URISTIX)  - PRENATAL SCREEN; Future  - CBC WITH DIFFERENTIAL WITH PLATELET; Future  - HEPATITIS B SURFACE ANTIGEN; Future  - T PALLIDUM SCREENING CASCADE; Future  - RUBELLA, IGG; Future  - HIV AG AB COMBO; Future  - URINE CULTURE, ROUTINE; Future  - HCV ANTIBODY; Future  - CHLAMYDIA/GONOCOCCUS, LAISHA; Future  - URINE CULTURE, ROUTINE  - CHLAMYDIA/GONOCOCCUS, LAISHA    2.  Obesity with body mass index (BMI) of 30.0 to 39.9  - OP REFERRAL TO  CONSULT  BASA daily    AASHISH 4 weeks/ prn

## 2022-11-22 LAB
C TRACH DNA SPEC QL NAA+PROBE: NEGATIVE
N GONORRHOEA DNA SPEC QL NAA+PROBE: NEGATIVE

## 2022-12-01 ENCOUNTER — LAB ENCOUNTER (OUTPATIENT)
Dept: LAB | Age: 32
End: 2022-12-01
Attending: NURSE PRACTITIONER
Payer: COMMERCIAL

## 2022-12-01 DIAGNOSIS — Z34.01 ENCOUNTER FOR SUPERVISION OF NORMAL FIRST PREGNANCY IN FIRST TRIMESTER: ICD-10-CM

## 2022-12-01 LAB
ANTIBODY SCREEN: POSITIVE
BASOPHILS # BLD AUTO: 0.03 X10(3) UL (ref 0–0.2)
BASOPHILS NFR BLD AUTO: 0.3 %
EOSINOPHIL # BLD AUTO: 0.06 X10(3) UL (ref 0–0.7)
EOSINOPHIL NFR BLD AUTO: 0.7 %
ERYTHROCYTE [DISTWIDTH] IN BLOOD BY AUTOMATED COUNT: 11 %
HBV SURFACE AG SER-ACNC: <0.1 [IU]/L
HBV SURFACE AG SERPL QL IA: NONREACTIVE
HCT VFR BLD AUTO: 40.6 %
HCV AB SERPL QL IA: NONREACTIVE
HGB BLD-MCNC: 14.7 G/DL
IMM GRANULOCYTES # BLD AUTO: 0.02 X10(3) UL (ref 0–1)
IMM GRANULOCYTES NFR BLD: 0.2 %
LYMPHOCYTES # BLD AUTO: 1.74 X10(3) UL (ref 1–4)
LYMPHOCYTES NFR BLD AUTO: 20.1 %
MCH RBC QN AUTO: 32.5 PG (ref 26–34)
MCHC RBC AUTO-ENTMCNC: 36.2 G/DL (ref 31–37)
MCV RBC AUTO: 89.8 FL
MONOCYTES # BLD AUTO: 0.58 X10(3) UL (ref 0.1–1)
MONOCYTES NFR BLD AUTO: 6.7 %
NEUTROPHILS # BLD AUTO: 6.21 X10 (3) UL (ref 1.5–7.7)
NEUTROPHILS # BLD AUTO: 6.21 X10(3) UL (ref 1.5–7.7)
NEUTROPHILS NFR BLD AUTO: 72 %
PLATELET # BLD AUTO: 316 10(3)UL (ref 150–450)
RBC # BLD AUTO: 4.52 X10(6)UL
RH BLOOD TYPE: POSITIVE
RUBV IGG SER QL: POSITIVE
RUBV IGG SER-ACNC: 240.9 IU/ML (ref 10–?)
T PALLIDUM AB SER QL IA: NONREACTIVE
WBC # BLD AUTO: 8.6 X10(3) UL (ref 4–11)

## 2022-12-01 PROCEDURE — 86900 BLOOD TYPING SEROLOGIC ABO: CPT

## 2022-12-01 PROCEDURE — 85025 COMPLETE CBC W/AUTO DIFF WBC: CPT

## 2022-12-01 PROCEDURE — 86901 BLOOD TYPING SEROLOGIC RH(D): CPT

## 2022-12-01 PROCEDURE — 86762 RUBELLA ANTIBODY: CPT

## 2022-12-01 PROCEDURE — 87389 HIV-1 AG W/HIV-1&-2 AB AG IA: CPT

## 2022-12-01 PROCEDURE — 36415 COLL VENOUS BLD VENIPUNCTURE: CPT

## 2022-12-01 PROCEDURE — 86870 RBC ANTIBODY IDENTIFICATION: CPT

## 2022-12-01 PROCEDURE — 87340 HEPATITIS B SURFACE AG IA: CPT

## 2022-12-01 PROCEDURE — 86803 HEPATITIS C AB TEST: CPT

## 2022-12-01 PROCEDURE — 86780 TREPONEMA PALLIDUM: CPT

## 2022-12-01 PROCEDURE — 86850 RBC ANTIBODY SCREEN: CPT

## 2022-12-05 PROBLEM — Z01.84 ANTIBODY RESPONSE EXAM: Status: ACTIVE | Noted: 2022-12-05

## 2022-12-06 ENCOUNTER — TELEPHONE (OUTPATIENT)
Dept: OBGYN CLINIC | Facility: CLINIC | Age: 32
End: 2022-12-06

## 2022-12-06 NOTE — TELEPHONE ENCOUNTER
Left detailed message with negative materniT 21 results. Advised tcb with any questions or if they wish to know the sex of the baby. Sex consistent with Male fetus.

## 2022-12-27 ENCOUNTER — ROUTINE PRENATAL (OUTPATIENT)
Dept: OBGYN CLINIC | Facility: CLINIC | Age: 32
End: 2022-12-27
Payer: COMMERCIAL

## 2022-12-27 VITALS
WEIGHT: 187.13 LBS | HEIGHT: 61 IN | DIASTOLIC BLOOD PRESSURE: 70 MMHG | SYSTOLIC BLOOD PRESSURE: 110 MMHG | BODY MASS INDEX: 35.33 KG/M2

## 2022-12-27 DIAGNOSIS — Z3A.16 16 WEEKS GESTATION OF PREGNANCY: Primary | ICD-10-CM

## 2022-12-27 PROCEDURE — 81002 URINALYSIS NONAUTO W/O SCOPE: CPT | Performed by: NURSE PRACTITIONER

## 2022-12-27 PROCEDURE — 3078F DIAST BP <80 MM HG: CPT | Performed by: NURSE PRACTITIONER

## 2022-12-27 PROCEDURE — 3074F SYST BP LT 130 MM HG: CPT | Performed by: NURSE PRACTITIONER

## 2022-12-27 PROCEDURE — 3008F BODY MASS INDEX DOCD: CPT | Performed by: NURSE PRACTITIONER

## 2022-12-27 NOTE — PROGRESS NOTES
AASHISH--[id number]    Doing well. Denies Vb, LOF, contractions. Is feeling fetal movement! A/P:   1. FHT-P  2. PNL: HIV negative  3. RH positive - lab also noted unspecified antibody, repeat antibody screen ordered  4. Obesity: Level II with MFM  5.  Reviewed medications in pregnancy      Return in 4 weeks

## 2023-01-10 ENCOUNTER — LAB ENCOUNTER (OUTPATIENT)
Dept: LAB | Age: 33
End: 2023-01-10
Attending: OBSTETRICS & GYNECOLOGY
Payer: COMMERCIAL

## 2023-01-10 ENCOUNTER — TELEPHONE (OUTPATIENT)
Dept: OBGYN CLINIC | Facility: CLINIC | Age: 33
End: 2023-01-10

## 2023-01-10 DIAGNOSIS — Z36.8A ENCOUNTER FOR ANTENATAL SCREENING FOR OTHER GENETIC DEFECT: ICD-10-CM

## 2023-01-10 DIAGNOSIS — Z01.84 ANTIBODY RESPONSE EXAM: ICD-10-CM

## 2023-01-10 LAB — ANTIBODY SCREEN: POSITIVE

## 2023-01-10 PROCEDURE — 86870 RBC ANTIBODY IDENTIFICATION: CPT

## 2023-01-10 PROCEDURE — 86850 RBC ANTIBODY SCREEN: CPT

## 2023-01-10 PROCEDURE — 81511 FTL CGEN ABNOR FOUR ANAL: CPT

## 2023-01-10 PROCEDURE — 36415 COLL VENOUS BLD VENIPUNCTURE: CPT

## 2023-01-10 PROCEDURE — 86886 COOMBS TEST INDIRECT TITER: CPT

## 2023-01-10 NOTE — TELEPHONE ENCOUNTER
Received phone call from the lab stating that patient's antibody is the same result from last month so they are unable to do a titer. They will be canceling the antibody titer.         Dwight Giron

## 2023-01-11 PROBLEM — O28.0 ABNORMAL QUAD SCREEN: Status: ACTIVE | Noted: 2023-01-11

## 2023-01-11 LAB
AFP SMOKING: NO
FAMILY HISTORY OF ANEUPLOIDY: NO
FAMILY HX NEURAL TUBE DEFECT: NO
INSULIN REQ MATERNAL DIABETES: NO
MATERNAL AGE OF DELIVERY: 32.7 YR
MOM FOR AFP: 3.61
MOM FOR DIA: 2.33
MOM FOR HCG: 2.89
MOM FOR UE3: 1.33
PATIENT'S AFP: 139 NG/ML
PATIENT'S DIA: 334 PG/ML
PATIENT'S HCG: ABNORMAL IU/L
PATIENT'S UE3: 2.05 NG/ML

## 2023-01-12 NOTE — PROGRESS NOTES
Received call back message from patient. Spoke with patient. Reviewed results and answered questions to the best of this RN's ability. Verified that pt history and demographics are correct to the best of her knowledge. Discussed recommendations and provided [facility] as additional options. Requested that patient let us know if she gets sooner [facility] as order may need to be faxed to them.

## 2023-01-12 NOTE — PROGRESS NOTES
Not entirely sure why this was ordered under my name as I have never met pt--but her AFP unfortunately is abnormal within the QUAD. I looked at her order, looks like NEAL is correct. Can you confirm she is in fact , does not have [facility] [place] of neural tube defects? If not, then I would suggest we try to move up her level 2 US for early anatomic survey if possible.  Please review with patient this is just a screening test, does not mean there is an issue, but means we need further evaluation with ultrasound

## 2023-01-18 ENCOUNTER — TELEPHONE (OUTPATIENT)
Dept: OBGYN CLINIC | Facility: CLINIC | Age: 33
End: 2023-01-18

## 2023-01-23 ENCOUNTER — ROUTINE PRENATAL (OUTPATIENT)
Dept: OBGYN CLINIC | Facility: CLINIC | Age: 33
End: 2023-01-23
Payer: COMMERCIAL

## 2023-01-23 VITALS — DIASTOLIC BLOOD PRESSURE: 74 MMHG | WEIGHT: 191.38 LBS | SYSTOLIC BLOOD PRESSURE: 112 MMHG | BODY MASS INDEX: 36 KG/M2

## 2023-01-23 DIAGNOSIS — Z3A.19 19 WEEKS GESTATION OF PREGNANCY: ICD-10-CM

## 2023-01-23 DIAGNOSIS — O28.0 ABNORMAL QUAD SCREEN: ICD-10-CM

## 2023-01-23 DIAGNOSIS — E66.9 OBESITY WITH BODY MASS INDEX (BMI) OF 30.0 TO 39.9: Primary | ICD-10-CM

## 2023-01-23 NOTE — PROGRESS NOTES
AASHISH 19w6d    Doing ok, had level 2 secondary to abnormal QUAD for ONTD. +FM  No contractions  No LOF, VB    1. FHT-present  2. PNL:  1 hour/CBC ordered and instructions given  3. Abnormal QUAD for ONTD: normal level 2. Needs growth US monthly in the third trimester WITH MFM. Discussed today. 4. Mode of delivery:  anticipated   5. Immunizations: discussed TDAP at 28+ weeks  6. Antibody positive in screen, cannot be detected: not addressed at MFM visit, message sent to ensure no further f/u needed.  Will repeat Ab screen with her 1 hour and CBC    Return in 4 weeks

## 2023-01-23 NOTE — PATIENT INSTRUCTIONS
Please call [facility] to schedule a growth US at 28 weeks      Please call [phone] to set up an appointment for your laboratory draw. SCREENING FOR GESTATIONAL DIABETES     Pregnancy is a stress to your body. One way in which the stress may manifest itself is through an abnormality in sugar metabolism. In non-pregnant patients, this abnormality (a lack of insulin) is known as diabetes. During pregnancy, the insulin normally secreted may become ineffective because of the various hormones and enzymes produced by the placenta. This leads to a condition known as gestational diabetes, Class A diabetes, or pregnancy induced glucose intolerance (PIGI). This condition disappears after delivery. It is known to occur in approximately 5-10% of all pregnancies. It is important because we know that increased maternal blood glucose (sugar) levels can cause fetal problems. That is, the morbidity and mortality rates can be doubled among pregnancies complicated by undiagnosed gestational diabetes. We screen all patients for gestational diabetes at around 24 weeks of pregnancy. The reason we wait until this time is that the stresses of pregnancy leading to diabetes become maximal around this time, therefore, the chance of a false negative test is minimized. If the diabetes is detected at this time, it gives us ample time to correct the glucose metabolism and hopefully, decrease  morbidity and mortality. An order will be placed and the screening will be done in an outpatient lab facility.   Please call the lab to schedule an appointment for this testing, the outpatient labs do not take walk-ins due to the length of this test.  On the day of your test, we suggest you eat a normal breakfast, but eliminate pastries/doughnuts the morning of your test.  We also recommend that you do not eat or drink anything with sugar in it 2 hours prior to your test.   Upon arrival at the outpatient laboratory facility, you will be given a 50 gm carbohydrate drink. A blood glucose level will then be drawn one hour after ingestion. If the blood glucose value is normal and no other risk factors exist, no further testing is necessary. If the blood glucose is abnormal, this indicates an increased risk of gestational diabetes and will require a formal three hour glucose tolerance test.  This test will be done within two weeks following your abnormal one hour screen. Approximately 15% of patients with positive screening tests will have an abnormal three hour test glucose tolerance test.     If you have any questions regarding this testing, please feel free to contact us. If gestational diabetes is detected, it will be thoroughly explained to you and discussion on management will take place with one of our physicians. *If your preferred Lab is THE [facility], you may choose either the [place] location or the [facility] location, [address] for your testing. You are also welcome to coordinate your lab appointment with your 24 week obstetric appointment so that you may spend your one hour wait time in our office rather than the lab facility [facility] has had significant outbreaks of pertussis (whooping cough) for the last few years. Therefore, the CDC recommends that all pregnant women receive a Tdap vaccine during pregnancy, regardless of whether you have received one previously. The vaccine reduces your chances of charan the illness, and also provides some natural immunity to your baby for possible exposures after birth. The best time to get the vaccine is between 27 and 36 weeks. Baby caregivers (grandparents, spouse) should also receive the vaccine. Influenza- Pregnant women who develop the flu are more prone to serious complications that can affect both mother and baby.   The CDC recommends that all women who will be pregnant during flu season (October to May) receive the flu vaccine (injection only, not nasal spray). The vaccine can be given during any stage of pregnancy. Both vaccines are offered in our office, as well as through many pharmacies and primary care offices. Pregnancy/Nursing and Covid-19 Vaccine   As the [facility] begins to make progress in administering the Covid-19 vaccine, we understand that our pregnant and breastfeeding patients will have questions about the safety of the Covid-19 vaccine. Keep in mind that information is evolving rapidly and that recommendations can change over time. The CDC, the Society for Maternal Fetal Medicine, and the [facility] of Obstetrics & Gynecology now recommend that all pregnant and breastfeeding women consider getting the Covid-19 vaccine, in consultation with their healthcare provider. Factors to consider include the available safety data, risks to pregnant women from Covid-19 infection, and your individual risk for infection and severe disease, based on your risk of exposure and any other medical risk factors that you may have. A recent study of 36,000 pregnant women confirmed the safety and effectiveness of the vaccine, with no increase risk of miscarriage. Here are some facts to consider when you are making a decision:   [1] Pregnant women are at higher risk for acquiring Covid-19 infection and have a subsequent higher risk for the following:  - severe illness  - adverse pregnancy outcomes including  birth  - hospitalization, ICU admission, need for mechanical ventilation and death  [2] The mRNA vaccines that are currently available do not contain live virus, do not enter the nucleus, do not cause genetic changes, do not alter human DNA, and cannot cause Covid-19 illness. [3] mRNA breaks down quickly, so it's unlikely that any of the mRNA in the vaccine would be able to get into your breast milk or into your baby through the placenta.   [4] The antibodies that your body produces in response to the vaccine will be passed to your baby through the placenta and provide some protection for your  baby against Covid-19 infection. [5] Whether you ultimately decide to receive the vaccine or not, do not forget to continue with other preventative measures including frequent hand washing, avoiding crowds, physical distancing and wearing a mask.

## 2023-02-27 ENCOUNTER — LAB ENCOUNTER (OUTPATIENT)
Dept: LAB | Age: 33
End: 2023-02-27
Attending: OBSTETRICS & GYNECOLOGY
Payer: COMMERCIAL

## 2023-02-27 ENCOUNTER — ROUTINE PRENATAL (OUTPATIENT)
Dept: OBGYN CLINIC | Facility: CLINIC | Age: 33
End: 2023-02-27
Payer: COMMERCIAL

## 2023-02-27 VITALS
SYSTOLIC BLOOD PRESSURE: 118 MMHG | DIASTOLIC BLOOD PRESSURE: 70 MMHG | BODY MASS INDEX: 38.33 KG/M2 | WEIGHT: 203 LBS | HEART RATE: 78 BPM | HEIGHT: 61 IN

## 2023-02-27 DIAGNOSIS — E66.9 OBESITY WITH BODY MASS INDEX (BMI) OF 30.0 TO 39.9: ICD-10-CM

## 2023-02-27 DIAGNOSIS — Z36.9 PRENATAL SCREENING ENCOUNTER: Primary | ICD-10-CM

## 2023-02-27 LAB
ANTIBODY SCREEN: POSITIVE
BASOPHILS # BLD AUTO: 0.03 X10(3) UL (ref 0–0.2)
BASOPHILS NFR BLD AUTO: 0.3 %
EOSINOPHIL # BLD AUTO: 0.05 X10(3) UL (ref 0–0.7)
EOSINOPHIL NFR BLD AUTO: 0.5 %
ERYTHROCYTE [DISTWIDTH] IN BLOOD BY AUTOMATED COUNT: 12.1 %
GLUCOSE (URINE DIPSTICK): NEGATIVE MG/DL
GLUCOSE 1H P GLC SERPL-MCNC: 135 MG/DL
HCT VFR BLD AUTO: 38.9 %
HGB BLD-MCNC: 13.4 G/DL
IMM GRANULOCYTES # BLD AUTO: 0.1 X10(3) UL (ref 0–1)
IMM GRANULOCYTES NFR BLD: 1.1 %
LYMPHOCYTES # BLD AUTO: 1.33 X10(3) UL (ref 1–4)
LYMPHOCYTES NFR BLD AUTO: 14.3 %
MCH RBC QN AUTO: 31.7 PG (ref 26–34)
MCHC RBC AUTO-ENTMCNC: 34.4 G/DL (ref 31–37)
MCV RBC AUTO: 92 FL
MONOCYTES # BLD AUTO: 0.52 X10(3) UL (ref 0.1–1)
MONOCYTES NFR BLD AUTO: 5.6 %
MULTISTIX LOT#: NORMAL NUMERIC
NEUTROPHILS # BLD AUTO: 7.26 X10 (3) UL (ref 1.5–7.7)
NEUTROPHILS # BLD AUTO: 7.26 X10(3) UL (ref 1.5–7.7)
NEUTROPHILS NFR BLD AUTO: 78.2 %
PLATELET # BLD AUTO: 290 10(3)UL (ref 150–450)
PROTEIN (URINE DIPSTICK): NEGATIVE MG/DL
RBC # BLD AUTO: 4.23 X10(6)UL
RH BLOOD TYPE: POSITIVE
WBC # BLD AUTO: 9.3 X10(3) UL (ref 4–11)

## 2023-02-27 PROCEDURE — 86900 BLOOD TYPING SEROLOGIC ABO: CPT

## 2023-02-27 PROCEDURE — 3078F DIAST BP <80 MM HG: CPT | Performed by: NURSE PRACTITIONER

## 2023-02-27 PROCEDURE — 81002 URINALYSIS NONAUTO W/O SCOPE: CPT | Performed by: NURSE PRACTITIONER

## 2023-02-27 PROCEDURE — 36415 COLL VENOUS BLD VENIPUNCTURE: CPT

## 2023-02-27 PROCEDURE — 3074F SYST BP LT 130 MM HG: CPT | Performed by: NURSE PRACTITIONER

## 2023-02-27 PROCEDURE — 3008F BODY MASS INDEX DOCD: CPT | Performed by: NURSE PRACTITIONER

## 2023-02-27 PROCEDURE — 86850 RBC ANTIBODY SCREEN: CPT

## 2023-02-27 PROCEDURE — 86870 RBC ANTIBODY IDENTIFICATION: CPT

## 2023-02-27 PROCEDURE — 86901 BLOOD TYPING SEROLOGIC RH(D): CPT

## 2023-02-27 PROCEDURE — 85025 COMPLETE CBC W/AUTO DIFF WBC: CPT

## 2023-02-27 PROCEDURE — 82950 GLUCOSE TEST: CPT

## 2023-02-27 NOTE — PROGRESS NOTES
Please let patient know she pass dher 1 hour  However, it looks like her antibody screen came back positive again but the lab did not comment further  Could you please comment on the lab for further clarification? Previously they said \"dilutent\"  If it is not strong enough to classify they need to state that   Thank you!

## 2023-02-27 NOTE — PROGRESS NOTES
AASHISH  Doing well, no immediate concerns  FM is good  RH positive  Seeing MFM for monthly growth US  1 hr glucose done today  TDAP recommended 28+ weeks  RTC 4wks

## 2023-02-27 NOTE — PROGRESS NOTES
Called lab and got transferred to the blood bank. Spoke to Girma who is the supervisor and states that the results will be \"undetermined specificity\" again and will need to be rechecked in 4 weeks. States that the results were very weak positive and not strong enough to titer. Plan would be to recheck in 4 weeks to rule out any concern.

## 2023-02-27 NOTE — PROGRESS NOTES
Called patient; no answer. Left a detailed message about 1 HR glucose test. Patient to wait for further recommendations in regards to antibody screen.

## 2023-03-01 NOTE — PROGRESS NOTES
Thank you Please order repeat AB screen and titer and HIV testing for her to do in 1 month under my name.  THanks

## 2023-03-02 NOTE — PROGRESS NOTES
Orders placed. Called patient; no answer. Left a detailed message; patient to call back if she has any questions.

## 2023-03-22 ENCOUNTER — ULTRASOUND ENCOUNTER (OUTPATIENT)
Dept: PERINATAL CARE | Facility: HOSPITAL | Age: 33
End: 2023-03-22
Attending: OBSTETRICS & GYNECOLOGY
Payer: COMMERCIAL

## 2023-03-22 ENCOUNTER — ROUTINE PRENATAL (OUTPATIENT)
Dept: OBGYN CLINIC | Facility: CLINIC | Age: 33
End: 2023-03-22
Payer: COMMERCIAL

## 2023-03-22 VITALS
WEIGHT: 210 LBS | BODY MASS INDEX: 39.65 KG/M2 | HEIGHT: 61 IN | DIASTOLIC BLOOD PRESSURE: 84 MMHG | HEART RATE: 101 BPM | SYSTOLIC BLOOD PRESSURE: 117 MMHG

## 2023-03-22 VITALS — WEIGHT: 210.38 LBS | SYSTOLIC BLOOD PRESSURE: 110 MMHG | BODY MASS INDEX: 40 KG/M2 | DIASTOLIC BLOOD PRESSURE: 78 MMHG

## 2023-03-22 DIAGNOSIS — Z34.90 PRENATAL CARE, ANTEPARTUM: ICD-10-CM

## 2023-03-22 DIAGNOSIS — E66.9 OBESITY WITH BODY MASS INDEX (BMI) OF 30.0 TO 39.9: ICD-10-CM

## 2023-03-22 DIAGNOSIS — O99.212 OBESITY AFFECTING PREGNANCY IN SECOND TRIMESTER: ICD-10-CM

## 2023-03-22 DIAGNOSIS — Z01.84 ANTIBODY RESPONSE EXAM: Primary | ICD-10-CM

## 2023-03-22 DIAGNOSIS — O28.0 ABNORMAL MSAFP (MATERNAL SERUM ALPHA-FETOPROTEIN), ELEVATED: ICD-10-CM

## 2023-03-22 PROCEDURE — 3074F SYST BP LT 130 MM HG: CPT | Performed by: OBSTETRICS & GYNECOLOGY

## 2023-03-22 PROCEDURE — 3078F DIAST BP <80 MM HG: CPT | Performed by: OBSTETRICS & GYNECOLOGY

## 2023-03-22 PROCEDURE — 81002 URINALYSIS NONAUTO W/O SCOPE: CPT | Performed by: OBSTETRICS & GYNECOLOGY

## 2023-03-22 PROCEDURE — 76816 OB US FOLLOW-UP PER FETUS: CPT | Performed by: OBSTETRICS & GYNECOLOGY

## 2023-03-22 NOTE — PROGRESS NOTES
Outpatient Maternal-Fetal Medicine Consultation    Dear Dr. Elma Freed    Thank you for requesting ultrasound evaluation and maternal fetal medicine consultation on your patient Tucker Cardona. As you are aware she is a 28year old female  with a singletonpregnancy and an Estimated Date of Delivery: 23. A maternal-fetal medicine f/u is today . Her prenatal records and labs were reviewed. pregnancy going well. HISTORY  Past OB History      Past Medical History  The patient has a past medical history of Ovarian cyst.    Past Surgical History  The patient has no past surgical history on file. Family History  The patient She indicated that the status of her mother is unknown. She indicated that the status of her father is unknown. She indicated that the status of her maternal grandmother is unknown and reported the following: lung cancer. She indicated that the status of her paternal grandmother is unknown and reported the following: CHF. Medications:   Current Outpatient Medications:   PRENATAL VIT-DSS-FE CBN-FA OR, Take by mouth As Directed., Disp: , Rfl:   cholecalciferol 400 units/mL Oral Liquid, Take by mouth As Directed., Disp: , Rfl:   Fluticasone Propionate 50 MCG/ACT Nasal Suspension, 1 spray by Each Nare route 2 (two) times a day. , Disp: , Rfl:   ibuprofen 200 MG Oral Tab, Take 200 mg by mouth every 6 (six) hours as needed for Pain., Disp: , Rfl:   Allergies: No Known Allergies    PHYSICAL EXAMINATION:     23  0758   BP: 117/84   Pulse: 101       General: alert and oriented,no acute distress  Abdomen: gravid, soft, non-tender      OBSTETRIC ULTRASOUND    Ultrasound Findings:  Single IUP in cephalic presentation. Placenta is posterior. A 3 vessel cord is noted. Cardiac activity is present at 136 bpm  EFW 1324 g ( 2 lb 15 oz); 65%. AC  MVP is 7.9 cm . BILLY 20.6 cm    The fetal measurements are consistent with established EDC.  No gross ultrasound evidence of structural abnormalities are seen today. The patient understands that ultrasound cannot rule out all structural and chromosomal abnormalities. See PACS/Imaging Tab For Complete Ultrasound Report  I interpreted the results and reviewed them with the patient. DISCUSSION  During her visit we discussed and reviewed the following issues:  UNEXPLAINED ELEVATED MSAFP (3.61 MoM)  I was reassured by the normal sonographic appearance of the fetal anterior abdominal wall, spine and CNS anatomy. Hence, a birth defect related to the elevated MSAFP is unlikely. I reviewed the option of amniocentesis to evaluate the AF AFP; however, the yield from this test is likely low given the normal sonographic appearance of the fetal anatomy. Please see previous [facility] detailed discussion. AC at 95%. GLUCOSE 1HR OB   Date Value Ref Range Status   02/27/2023 135 (H) See comment mg/dL Final     Comment:     If the plasma glucose level measured after 1 hour is >=130, 135 or 140 mg/dl proceed to \"Glucose Tolerance, 100 gm (0 hr, 1 hr, 2hr, 3hr), Gestational (ADA)\" test on a separate day, as clinically indicated. Recommend 3 hr gtt to see if GDM is cause of increased abdominal circumference  IMPRESSION:  IUP at 28w1d   Elevated MSAFP: reassuring level II    RECOMMENDATIONS:  Continue care with Dr. Lonell Claude  Monthly growth ultrasound in the third trimester  Recommend 3 hr gtt      Thank you for allowing me to participate in the care of your patient. Please do not hesitate to contact me if additional questions or concerns arise. Maty Brody MD     20minutes spent in review of records, patient consultation, documentation and coordination of care. The relevant clinical matter(s) are summarized above. Note to patient and family  The [facility] makes medical notes available to patients in the interest of transparency. However, please be advised that this is a medical document. It is intended as cvwt-zq-[id number] communication.  It is written and medical language may contain abbreviations or verbiage that are technical and unfamiliar. It may appear blunt or direct. Medical documents are intended to carry relevant information, facts as evident, and the clinical opinion of the practitioner.

## 2023-03-22 NOTE — PROGRESS NOTES
AASHISH 28w0d    Doing ok, +FM  No regular contractions  no LOF, VB  Was told the [facility] was increased this AM at [facility] and should have a 3 hour GTT  She had a small area near her umbilicus to the right that was painful, now resolved. She also has some RLQ Pain at the end of the day    1. FHT-present  2. PNL:  HIV ordered  3. Antibody of uncertain type: repeat AB screen ordered to be done with HIV  4. Mode of delivery:  anticipated   5. Immunizations: TDAP discussed-->she accepts next visit. 6. Obesity: had US with [facility] today and above noted. Her 1 hour was 135. Will obtain 3 hour, ordered  7. Reassured re-pain above.  Likely nerve pain and or round ligament pain or gas    Return in 2 weeks

## 2023-03-22 NOTE — PATIENT INSTRUCTIONS
[employer] has had significant outbreaks of pertussis (whooping cough) for the last few years. Therefore, the CDC recommends that all pregnant women receive a Tdap vaccine during pregnancy, regardless of whether you have received one previously. The vaccine reduces your chances of charan the illness, and also provides some natural immunity to your baby for possible exposures after birth. The best time to get the vaccine is between 27 and 36 weeks. Baby caregivers (grandparents, spouse) should also receive the vaccine. Influenza- Pregnant women who develop the flu are more prone to serious complications that can affect both mother and baby. The CDC recommends that all women who will be pregnant during flu season (October to May) receive the flu vaccine (injection only, not nasal spray). The vaccine can be given during any stage of pregnancy. Both vaccines are offered in our office, as well as through many pharmacies and primary care offices. Pregnancy/Nursing and Covid-19 Vaccine   As the [facility]  begins to make progress in administering the Covid-19 vaccine, we understand that our pregnant and breastfeeding patients will have questions about the safety of the Covid-19 vaccine. Keep in mind that information is evolving rapidly and that recommendations can change over time. The CDC, the Society for Maternal Fetal Medicine, and the [facility] of Obstetrics & Gynecology now recommend that all pregnant and breastfeeding women consider getting the Covid-19 vaccine, in consultation with their healthcare provider. Factors to consider include the available safety data, risks to pregnant women from Covid-19 infection, and your individual risk for infection and severe disease, based on your risk of exposure and any other medical risk factors that you may have.   A recent study of 36,000 pregnant women confirmed the safety and effectiveness of the vaccine, with no increase risk of miscarriage. Here are some facts to consider when you are making a decision:   [1] Pregnant women are at higher risk for acquiring Covid-19 infection and have a subsequent higher risk for the following:  - severe illness  - adverse pregnancy outcomes including  birth  - hospitalization, ICU admission, need for mechanical ventilation and death  [2] The mRNA vaccines that are currently available do not contain live virus, do not enter the nucleus, do not cause genetic changes, do not alter human DNA, and cannot cause Covid-19 illness. [3] mRNA breaks down quickly, so it's unlikely that any of the mRNA in the vaccine would be able to get into your breast milk or into your baby through the placenta. [4] The antibodies that your body produces in response to the vaccine will be passed to your baby through the placenta and provide some protection for your  baby against Covid-19 infection. [5] Whether you ultimately decide to receive the vaccine or not, do not forget to continue with other preventative measures including frequent hand washing, avoiding crowds, physical distancing and wearing a mask.

## 2023-03-28 ENCOUNTER — LAB ENCOUNTER (OUTPATIENT)
Dept: LAB | Age: 33
End: 2023-03-28
Attending: OBSTETRICS & GYNECOLOGY
Payer: COMMERCIAL

## 2023-03-28 DIAGNOSIS — Z36.9 PRENATAL SCREENING ENCOUNTER: ICD-10-CM

## 2023-03-28 DIAGNOSIS — E66.9 OBESITY WITH BODY MASS INDEX (BMI) OF 30.0 TO 39.9: ICD-10-CM

## 2023-03-28 LAB
ANTIBODY SCREEN: POSITIVE
GLUCOSE 1H P GLC SERPL-MCNC: 112 MG/DL
GLUCOSE 2H P GLC SERPL-MCNC: 114 MG/DL
GLUCOSE 3H P GLC SERPL-MCNC: 80 MG/DL (ref 70–140)
GLUCOSE P FAST SERPL-MCNC: 85 MG/DL

## 2023-03-28 PROCEDURE — 86886 COOMBS TEST INDIRECT TITER: CPT

## 2023-03-28 PROCEDURE — 82951 GLUCOSE TOLERANCE TEST (GTT): CPT

## 2023-03-28 PROCEDURE — 36415 COLL VENOUS BLD VENIPUNCTURE: CPT

## 2023-03-28 PROCEDURE — 86870 RBC ANTIBODY IDENTIFICATION: CPT

## 2023-03-28 PROCEDURE — 87389 HIV-1 AG W/HIV-1&-2 AB AG IA: CPT

## 2023-03-28 PROCEDURE — 82952 GTT-ADDED SAMPLES: CPT

## 2023-03-28 PROCEDURE — 86850 RBC ANTIBODY SCREEN: CPT

## 2023-04-10 ENCOUNTER — ROUTINE PRENATAL (OUTPATIENT)
Dept: OBGYN CLINIC | Facility: CLINIC | Age: 33
End: 2023-04-10
Payer: COMMERCIAL

## 2023-04-10 VITALS
WEIGHT: 212.13 LBS | SYSTOLIC BLOOD PRESSURE: 116 MMHG | HEIGHT: 61 IN | DIASTOLIC BLOOD PRESSURE: 78 MMHG | HEART RATE: 121 BPM | BODY MASS INDEX: 40.05 KG/M2

## 2023-04-10 DIAGNOSIS — Z3A.30 30 WEEKS GESTATION OF PREGNANCY: Primary | ICD-10-CM

## 2023-04-10 DIAGNOSIS — Z23 NEED FOR VACCINATION: ICD-10-CM

## 2023-04-10 PROCEDURE — 90715 TDAP VACCINE 7 YRS/> IM: CPT | Performed by: NURSE PRACTITIONER

## 2023-04-10 PROCEDURE — 3074F SYST BP LT 130 MM HG: CPT | Performed by: NURSE PRACTITIONER

## 2023-04-10 PROCEDURE — 81002 URINALYSIS NONAUTO W/O SCOPE: CPT | Performed by: NURSE PRACTITIONER

## 2023-04-10 PROCEDURE — 3078F DIAST BP <80 MM HG: CPT | Performed by: NURSE PRACTITIONER

## 2023-04-10 PROCEDURE — 3008F BODY MASS INDEX DOCD: CPT | Performed by: NURSE PRACTITIONER

## 2023-04-10 PROCEDURE — 90471 IMMUNIZATION ADMIN: CPT | Performed by: NURSE PRACTITIONER

## 2023-04-10 NOTE — PROGRESS NOTES
AASHISH  Doing well, next growth US with MFM 4/24/2023  GOOD FM  Denies VB/LOF/uctx  TDAP received  Notes her heart rate increases into the 120's at times even at rest. She denies any palpitation, chest pain or dyspnea. She is to go to the Er with any symptoms or if it is elevated at rest for prolonged period.   RTC in 2 wks  Fetal movement instructions given

## 2023-04-24 ENCOUNTER — OFFICE VISIT (OUTPATIENT)
Dept: PERINATAL CARE | Facility: HOSPITAL | Age: 33
End: 2023-04-24
Attending: OBSTETRICS & GYNECOLOGY
Payer: COMMERCIAL

## 2023-04-24 VITALS
BODY MASS INDEX: 40.22 KG/M2 | WEIGHT: 213 LBS | DIASTOLIC BLOOD PRESSURE: 85 MMHG | HEART RATE: 118 BPM | SYSTOLIC BLOOD PRESSURE: 116 MMHG | HEIGHT: 61 IN

## 2023-04-24 DIAGNOSIS — O28.0 ABNORMAL MSAFP (MATERNAL SERUM ALPHA-FETOPROTEIN), ELEVATED: ICD-10-CM

## 2023-04-24 DIAGNOSIS — O99.212 OBESITY AFFECTING PREGNANCY IN SECOND TRIMESTER: ICD-10-CM

## 2023-04-24 DIAGNOSIS — O28.0 ABNORMAL MSAFP (MATERNAL SERUM ALPHA-FETOPROTEIN), ELEVATED: Primary | ICD-10-CM

## 2023-04-24 PROCEDURE — 76816 OB US FOLLOW-UP PER FETUS: CPT | Performed by: OBSTETRICS & GYNECOLOGY

## 2023-04-24 PROCEDURE — 76819 FETAL BIOPHYS PROFIL W/O NST: CPT

## 2023-04-24 NOTE — PROGRESS NOTES
Outpatient Maternal-Fetal Medicine Consultation    Dear Dr. Tiarra Heredia    Thank you for requesting ultrasound evaluation and maternal fetal medicine consultation on your patient Melvin Mesa. As you are aware she is a 28year old female  with a singletonpregnancy and an Estimated Date of Delivery: 23. A maternal-fetal medicine f/u is today . Her prenatal records and labs were reviewed. pregnancy going well. HISTORY  Past OB History      Past Medical History  The patient has a past medical history of Ovarian cyst.    Past Surgical History  The patient has no past surgical history on file. Family History  The patient She indicated that the status of her mother is unknown. She indicated that the status of her father is unknown. She indicated that the status of her maternal grandmother is unknown and reported the following: lung cancer. She indicated that the status of her paternal grandmother is unknown and reported the following: CHF. Medications:   Current Outpatient Medications:   PRENATAL VIT-DSS-FE CBN-FA OR, Take by mouth As Directed., Disp: , Rfl:   cholecalciferol 400 units/mL Oral Liquid, Take by mouth As Directed., Disp: , Rfl:   Fluticasone Propionate 50 MCG/ACT Nasal Suspension, 1 spray by Each Nare route 2 (two) times a day. , Disp: , Rfl:   ibuprofen 200 MG Oral Tab, Take 200 mg by mouth every 6 (six) hours as needed for Pain., Disp: , Rfl:   Allergies: No Known Allergies    PHYSICAL EXAMINATION:     23  0930   BP: 116/85   Pulse: 118       General: alert and oriented,no acute distress  Abdomen: gravid, soft, non-tender        DISCUSSION  During her visit we discussed and reviewed the following issues:  UNEXPLAINED ELEVATED MSAFP (3.61 MoM)  I was reassured by the normal sonographic appearance of the fetal anterior abdominal wall, spine and CNS anatomy. Hence, a birth defect related to the elevated MSAFP is unlikely.  I reviewed the option of amniocentesis to evaluate the AF AFP; however, the yield from this test is likely low given the normal sonographic appearance of the fetal anatomy. Please see previous MFM detailed discussion. AC at 95%. GLUCOSE 1HR OB   Date Value Ref Range Status   2023 135 (H) See comment mg/dL Final     Comment:     If the plasma glucose level measured after 1 hour is >=130, 135 or 140 mg/dl proceed to \"Glucose Tolerance, 100 gm (0 hr, 1 hr, 2hr, 3hr), Gestational (ADA)\" test on a separate day, as clinically indicated. OB ULTRASOUND REPORT   See imaging tab for complete ultrasound report or in PACS    Single IUP in cephalic presentation. Placenta is posterior. A 3 vessel cord is noted. Cardiac activity is present at 136 bpm  EFW 2276 g ( 5 lb 0 oz); 60%. BILLY is  20.2 cm. MVP is 7.6 cm      BIOPHYSICAL PROFILE:  Movement:    2/2  Tone:            2/2  Breathin/2  Fluid:             2/2  TOTAL:               IMPRESSION:  IUP at 32w6d   Elevated MSAFP: reassuring level II    RECOMMENDATIONS:  Continue care with Dr. Marjorie Christine  Monthly growth ultrasound      Thank you for allowing me to participate in the care of your patient. Please do not hesitate to contact me if additional questions or concerns arise. Luis A Faust D.O. Maternal Fetal Medicine     20 minutes spent in review of records, patient consultation, documentation and coordination of care. The relevant clinical matter(s) are summarized above. Note to patient and family  The [facility] makes medical notes available to patients in the interest of transparency. However, please be advised that this is a medical document. It is intended as qdog-rd-tdww communication. It is written and medical language may contain abbreviations or verbiage that are technical and unfamiliar. It may appear blunt or direct. Medical documents are intended to carry relevant information, facts as evident, and the clinical opinion of the practitioner.

## 2023-04-25 ENCOUNTER — TELEPHONE (OUTPATIENT)
Dept: OBGYN CLINIC | Facility: CLINIC | Age: 33
End: 2023-04-25

## 2023-04-25 NOTE — TELEPHONE ENCOUNTER
Received breast pump orders from [facility]. Dr. Joanna Elizabeth signed and I faxed back. Copy in plfd.

## 2023-04-28 ENCOUNTER — ROUTINE PRENATAL (OUTPATIENT)
Dept: OBGYN CLINIC | Facility: CLINIC | Age: 33
End: 2023-04-28
Payer: COMMERCIAL

## 2023-04-28 VITALS
HEART RATE: 101 BPM | DIASTOLIC BLOOD PRESSURE: 80 MMHG | SYSTOLIC BLOOD PRESSURE: 120 MMHG | WEIGHT: 214 LBS | BODY MASS INDEX: 40 KG/M2

## 2023-04-28 DIAGNOSIS — Z34.90 PRENATAL CARE, ANTEPARTUM: Primary | ICD-10-CM

## 2023-04-28 PROCEDURE — 81002 URINALYSIS NONAUTO W/O SCOPE: CPT | Performed by: NURSE PRACTITIONER

## 2023-04-28 PROCEDURE — 3079F DIAST BP 80-89 MM HG: CPT | Performed by: NURSE PRACTITIONER

## 2023-04-28 PROCEDURE — 3074F SYST BP LT 130 MM HG: CPT | Performed by: NURSE PRACTITIONER

## 2023-04-28 NOTE — PROGRESS NOTES
AASHISH 33w3d    Pt notes that she is having symptoms of carpal tunnel. Discussed wrist splints and supportive measures. +FM  Denies contractions  Denies LOF, VB      1. FHT-P  2. Mode of delivery: anticipate    3. Immunizations: s/p TDAP  4.  labor precautions reviewed  5.  Fetal movement expectations discussed    Return in 2 weeks

## 2023-05-08 ENCOUNTER — ROUTINE PRENATAL (OUTPATIENT)
Dept: OBGYN CLINIC | Facility: CLINIC | Age: 33
End: 2023-05-08
Payer: COMMERCIAL

## 2023-05-08 VITALS
WEIGHT: 216.38 LBS | DIASTOLIC BLOOD PRESSURE: 70 MMHG | HEIGHT: 61 IN | SYSTOLIC BLOOD PRESSURE: 118 MMHG | BODY MASS INDEX: 40.85 KG/M2

## 2023-05-08 DIAGNOSIS — O28.0 ABNORMAL MSAFP (MATERNAL SERUM ALPHA-FETOPROTEIN), ELEVATED: ICD-10-CM

## 2023-05-08 DIAGNOSIS — Z01.84 ANTIBODY RESPONSE EXAM: ICD-10-CM

## 2023-05-08 DIAGNOSIS — Z34.00 SUPERVISION OF NORMAL FIRST PREGNANCY, ANTEPARTUM: Primary | ICD-10-CM

## 2023-05-08 DIAGNOSIS — O99.210 OBESITY AFFECTING PREGNANCY, ANTEPARTUM: ICD-10-CM

## 2023-05-08 DIAGNOSIS — E66.9 OBESITY WITH BODY MASS INDEX (BMI) OF 30.0 TO 39.9: ICD-10-CM

## 2023-05-08 LAB
GLUCOSE (URINE DIPSTICK): NEGATIVE MG/DL
MULTISTIX LOT#: NORMAL NUMERIC

## 2023-05-08 NOTE — PROGRESS NOTES
AASHISH    GA: 34w6d   23  1457   BP: 118/70   Weight: 216 lb 6.4 oz (98.2 kg)   Height: 61\"       Doing well, +FM  Denies LOF/VB/uctx  Rh +, TDAP received, EPDS 0  PTL and Fetal movement instructions given      Problem List Items Addressed This Visit        Endocrine and Metabolic    Obesity with body mass index (BMI) of 30.0 to 39.9       Gravid and     Abnormal MSAFP (maternal serum alpha-fetoprotein), elevated       Health Encounters    Antibody response exam   Other Visit Diagnoses     Supervision of normal first pregnancy, antepartum    -  Primary    Relevant Orders    URINALYSIS NONAUTO W/O SCOPE (OB URISTIX) (Completed)          RTC in 1 wk w/ NST       Note to patient and family   The [facility] makes medical notes available to patients in the interest of transparency. However, please be advised that this is a medical document. It is intended as igtj-vs-yuqw communication. It is written and medical language may contain abbreviations or verbiage that are technical and unfamiliar. It may appear blunt or direct. Medical documents are intended to carry relevant information, facts as evident, and the clinical opinion of the practitioner.

## 2023-05-16 ENCOUNTER — APPOINTMENT (OUTPATIENT)
Dept: OBGYN CLINIC | Facility: CLINIC | Age: 33
End: 2023-05-16
Payer: COMMERCIAL

## 2023-05-16 ENCOUNTER — ROUTINE PRENATAL (OUTPATIENT)
Dept: OBGYN CLINIC | Facility: CLINIC | Age: 33
End: 2023-05-16
Payer: COMMERCIAL

## 2023-05-16 ENCOUNTER — TELEPHONE (OUTPATIENT)
Dept: OBGYN CLINIC | Facility: CLINIC | Age: 33
End: 2023-05-16

## 2023-05-16 VITALS
SYSTOLIC BLOOD PRESSURE: 120 MMHG | BODY MASS INDEX: 40.66 KG/M2 | DIASTOLIC BLOOD PRESSURE: 80 MMHG | WEIGHT: 215.38 LBS | HEIGHT: 61 IN | HEART RATE: 111 BPM

## 2023-05-16 DIAGNOSIS — Z3A.36 36 WEEKS GESTATION OF PREGNANCY: ICD-10-CM

## 2023-05-16 PROCEDURE — 87653 STREP B DNA AMP PROBE: CPT | Performed by: OBSTETRICS & GYNECOLOGY

## 2023-05-16 PROCEDURE — 59025 FETAL NON-STRESS TEST: CPT | Performed by: OBSTETRICS & GYNECOLOGY

## 2023-05-16 PROCEDURE — 87081 CULTURE SCREEN ONLY: CPT | Performed by: OBSTETRICS & GYNECOLOGY

## 2023-05-16 PROCEDURE — 3008F BODY MASS INDEX DOCD: CPT | Performed by: OBSTETRICS & GYNECOLOGY

## 2023-05-16 PROCEDURE — 3074F SYST BP LT 130 MM HG: CPT | Performed by: OBSTETRICS & GYNECOLOGY

## 2023-05-16 PROCEDURE — 81002 URINALYSIS NONAUTO W/O SCOPE: CPT | Performed by: OBSTETRICS & GYNECOLOGY

## 2023-05-16 PROCEDURE — 3079F DIAST BP 80-89 MM HG: CPT | Performed by: OBSTETRICS & GYNECOLOGY

## 2023-05-16 NOTE — TELEPHONE ENCOUNTER
----- Message from Author MD Cata sent at 5/16/2023  1:05 PM CDT -----  IOL scheduled on 6/6 [facility]

## 2023-05-16 NOTE — PROGRESS NOTES
AASHISH 36w0d    Doing well, +FM  No contractions  No LOF, VB  Wants to discuss IOL at 39 weeks secondary to severe carpel tunnel symptoms, impacting her job, discomfort, and fetal size. 1. FHT-NST is reactive  2. PNL:  GBS collected  3. Mode of delivery:  anticipated  4. Obesity, elevated MSAFP: normal 32 week growth US. Continue monthly growth US--next  or after, NSTs at 36 weeks. Discussed with patient--Dr. Christos Osorio had told her she didn'tneed to return. I sent him a message to clarify, will let pt know. 5. Immunizations: s/p TDAP  6. Delivery planning: discussed option of IOL, we reviewed scheduling with [facility] and potential delays.  I scheduled a cytotec for her on  at 20:00  Return in 1 weeks

## 2023-05-17 ENCOUNTER — TELEPHONE (OUTPATIENT)
Dept: OBGYN CLINIC | Facility: CLINIC | Age: 33
End: 2023-05-17

## 2023-05-17 NOTE — TELEPHONE ENCOUNTER
Discussed with Dr. Yuan Ace [facility].  No further growth US needed    Kiersten Singer MD, 05/17/23, 9:50 AM

## 2023-05-18 LAB — GROUP B STREP BY PCR FOR PCR OVT: NEGATIVE

## 2023-05-22 ENCOUNTER — APPOINTMENT (OUTPATIENT)
Dept: OBGYN CLINIC | Facility: CLINIC | Age: 33
End: 2023-05-22
Payer: COMMERCIAL

## 2023-05-22 ENCOUNTER — ROUTINE PRENATAL (OUTPATIENT)
Dept: OBGYN CLINIC | Facility: CLINIC | Age: 33
End: 2023-05-22
Payer: COMMERCIAL

## 2023-05-22 VITALS
HEIGHT: 61 IN | WEIGHT: 219.38 LBS | HEART RATE: 104 BPM | BODY MASS INDEX: 41.42 KG/M2 | DIASTOLIC BLOOD PRESSURE: 72 MMHG | SYSTOLIC BLOOD PRESSURE: 116 MMHG

## 2023-05-22 DIAGNOSIS — O99.210 OBESITY AFFECTING PREGNANCY, ANTEPARTUM: ICD-10-CM

## 2023-05-22 DIAGNOSIS — Z3A.36 36 WEEKS GESTATION OF PREGNANCY: Primary | ICD-10-CM

## 2023-05-22 NOTE — PROGRESS NOTES
36w6d seen for routine OB visit. Denies ctx, lof, vb. Reports good FM. Reports increasing BP at home, but not >140/90. Swollen hands and feet. Denies HA, vision changes, RUQ pain. Patient Active Problem List:     BMI 40.0-44.9, adult ([facility] [address]     Therapeutic drug monitoring     Binge eating     Shifting sleep-work schedule, affecting sleep     Antibody response exam     Abnormal MSAFP (maternal serum alpha-fetoprotein), elevated     Obesity affecting pregnancy, antepartum     Supervision of normal first pregnancy, antepartum       Gen: AAOx3, NAD  Resp: breathing comfortably  Abdomen: gravid, soft, nontender  Ext: nontender, no edema    NST - baseline 135, moderate variability, +15x15 accels, no decels  [place] - quiet     Plan:  - continue to monitor  - GBS neg  - scheduled for IOL   - return precautions reviewed    RTO in 1 week(s).

## 2023-06-02 ENCOUNTER — APPOINTMENT (OUTPATIENT)
Dept: OBGYN CLINIC | Facility: CLINIC | Age: 33
End: 2023-06-02
Payer: COMMERCIAL

## 2023-06-02 ENCOUNTER — ROUTINE PRENATAL (OUTPATIENT)
Dept: OBGYN CLINIC | Facility: CLINIC | Age: 33
End: 2023-06-02
Payer: COMMERCIAL

## 2023-06-02 VITALS
HEART RATE: 99 BPM | WEIGHT: 221.13 LBS | DIASTOLIC BLOOD PRESSURE: 78 MMHG | HEIGHT: 61 IN | BODY MASS INDEX: 41.75 KG/M2 | SYSTOLIC BLOOD PRESSURE: 120 MMHG

## 2023-06-02 DIAGNOSIS — O99.210 OBESITY AFFECTING PREGNANCY, ANTEPARTUM: Primary | ICD-10-CM

## 2023-06-02 DIAGNOSIS — Z3A.38 38 WEEKS GESTATION OF PREGNANCY: ICD-10-CM

## 2023-06-02 PROCEDURE — 59025 FETAL NON-STRESS TEST: CPT | Performed by: NURSE PRACTITIONER

## 2023-06-02 PROCEDURE — 3078F DIAST BP <80 MM HG: CPT | Performed by: NURSE PRACTITIONER

## 2023-06-02 PROCEDURE — 3008F BODY MASS INDEX DOCD: CPT | Performed by: NURSE PRACTITIONER

## 2023-06-02 PROCEDURE — 81002 URINALYSIS NONAUTO W/O SCOPE: CPT | Performed by: NURSE PRACTITIONER

## 2023-06-02 PROCEDURE — 3074F SYST BP LT 130 MM HG: CPT | Performed by: NURSE PRACTITIONER

## 2023-06-02 NOTE — PROGRESS NOTES
AASHISH 38w3d    Still with carpal tunnel. Anticipate delivery at 39 weeks   +FM  Denies contractions  Denies LOF, VB      1. FHT-NST reactive  2. PNL:  GBS negative  3. Mode of delivery: anticipate , scheduled for IOL 2023   4. Obesity: weekly NST  5. Labor precautions reviewed  6.  Fetal movement expectations discussed    Return postpartum

## 2023-06-06 ENCOUNTER — APPOINTMENT (OUTPATIENT)
Dept: OBGYN CLINIC | Facility: HOSPITAL | Age: 33
End: 2023-06-06
Payer: COMMERCIAL

## 2023-06-06 ENCOUNTER — HOSPITAL ENCOUNTER (INPATIENT)
Facility: HOSPITAL | Age: 33
LOS: 7 days | Discharge: HOME | End: 2023-06-13
Attending: OBSTETRICS & GYNECOLOGY | Admitting: OBSTETRICS & GYNECOLOGY
Payer: COMMERCIAL

## 2023-06-06 PROBLEM — Z34.90 PREGNANCY: Status: ACTIVE | Noted: 2023-06-06

## 2023-06-06 PROBLEM — Z34.90 PREGNANCY (HCC): Status: ACTIVE | Noted: 2023-06-06

## 2023-06-06 LAB
ANTIBODY SCREEN: POSITIVE
BASOPHILS # BLD AUTO: 0.04 X10(3) UL (ref 0–0.2)
BASOPHILS NFR BLD AUTO: 0.4 %
EOSINOPHIL # BLD AUTO: 0.07 X10(3) UL (ref 0–0.7)
EOSINOPHIL NFR BLD AUTO: 0.6 %
ERYTHROCYTE [DISTWIDTH] IN BLOOD BY AUTOMATED COUNT: 12 %
HCT VFR BLD AUTO: 42.6 %
HGB BLD-MCNC: 14.8 G/DL
IMM GRANULOCYTES # BLD AUTO: 0.12 X10(3) UL (ref 0–1)
IMM GRANULOCYTES NFR BLD: 1.1 %
LYMPHOCYTES # BLD AUTO: 1.93 X10(3) UL (ref 1–4)
LYMPHOCYTES NFR BLD AUTO: 17 %
MCH RBC QN AUTO: 31.6 PG (ref 26–34)
MCHC RBC AUTO-ENTMCNC: 34.7 G/DL (ref 31–37)
MCV RBC AUTO: 90.8 FL
MONOCYTES # BLD AUTO: 0.75 X10(3) UL (ref 0.1–1)
MONOCYTES NFR BLD AUTO: 6.6 %
NEUTROPHILS # BLD AUTO: 8.46 X10 (3) UL (ref 1.5–7.7)
NEUTROPHILS # BLD AUTO: 8.46 X10(3) UL (ref 1.5–7.7)
NEUTROPHILS NFR BLD AUTO: 74.3 %
PLATELET # BLD AUTO: 231 10(3)UL (ref 150–450)
RBC # BLD AUTO: 4.69 X10(6)UL
RH BLOOD TYPE: POSITIVE
T PALLIDUM AB SER QL IA: NONREACTIVE
WBC # BLD AUTO: 11.4 X10(3) UL (ref 4–11)

## 2023-06-06 RX ORDER — ACETAMINOPHEN 500 MG
500 TABLET ORAL EVERY 6 HOURS PRN
Status: DISCONTINUED | OUTPATIENT
Start: 2023-06-06 | End: 2023-06-08 | Stop reason: HOSPADM

## 2023-06-06 RX ORDER — ACETAMINOPHEN 500 MG
1000 TABLET ORAL EVERY 6 HOURS PRN
Status: DISCONTINUED | OUTPATIENT
Start: 2023-06-06 | End: 2023-06-08 | Stop reason: HOSPADM

## 2023-06-06 RX ORDER — SODIUM CHLORIDE, SODIUM LACTATE, POTASSIUM CHLORIDE, CALCIUM CHLORIDE 600; 310; 30; 20 MG/100ML; MG/100ML; MG/100ML; MG/100ML
INJECTION, SOLUTION INTRAVENOUS CONTINUOUS
Status: DISCONTINUED | OUTPATIENT
Start: 2023-06-06 | End: 2023-06-08 | Stop reason: HOSPADM

## 2023-06-06 RX ORDER — HYDROMORPHONE HYDROCHLORIDE 1 MG/ML
1 INJECTION, SOLUTION INTRAMUSCULAR; INTRAVENOUS; SUBCUTANEOUS EVERY 2 HOUR PRN
Status: DISCONTINUED | OUTPATIENT
Start: 2023-06-06 | End: 2023-06-08

## 2023-06-06 RX ORDER — TERBUTALINE SULFATE 1 MG/ML
0.25 INJECTION, SOLUTION SUBCUTANEOUS AS NEEDED
Status: DISCONTINUED | OUTPATIENT
Start: 2023-06-06 | End: 2023-06-08 | Stop reason: HOSPADM

## 2023-06-06 RX ORDER — ONDANSETRON 2 MG/ML
4 INJECTION INTRAMUSCULAR; INTRAVENOUS EVERY 6 HOURS PRN
Status: DISCONTINUED | OUTPATIENT
Start: 2023-06-06 | End: 2023-06-08 | Stop reason: HOSPADM

## 2023-06-06 RX ORDER — CALCIUM CARBONATE 500 MG/1
1000 TABLET, CHEWABLE ORAL EVERY 4 HOURS PRN
Status: DISCONTINUED | OUTPATIENT
Start: 2023-06-06 | End: 2023-06-08 | Stop reason: HOSPADM

## 2023-06-06 RX ORDER — IBUPROFEN 600 MG/1
600 TABLET ORAL ONCE AS NEEDED
Status: DISCONTINUED | OUTPATIENT
Start: 2023-06-06 | End: 2023-06-08 | Stop reason: HOSPADM

## 2023-06-06 RX ORDER — TRISODIUM CITRATE DIHYDRATE AND CITRIC ACID MONOHYDRATE 500; 334 MG/5ML; MG/5ML
30 SOLUTION ORAL AS NEEDED
Status: DISCONTINUED | OUTPATIENT
Start: 2023-06-06 | End: 2023-06-08 | Stop reason: HOSPADM

## 2023-06-06 RX ORDER — DEXTROSE, SODIUM CHLORIDE, SODIUM LACTATE, POTASSIUM CHLORIDE, AND CALCIUM CHLORIDE 5; .6; .31; .03; .02 G/100ML; G/100ML; G/100ML; G/100ML; G/100ML
INJECTION, SOLUTION INTRAVENOUS AS NEEDED
Status: DISCONTINUED | OUTPATIENT
Start: 2023-06-06 | End: 2023-06-08 | Stop reason: HOSPADM

## 2023-06-07 PROBLEM — Z98.890 STATUS POST INDUCTION OF LABOR: Status: ACTIVE | Noted: 2023-06-07

## 2023-06-07 PROBLEM — R74.01 ELEVATED AST (SGOT): Status: ACTIVE | Noted: 2023-06-07

## 2023-06-07 LAB
ALBUMIN SERPL-MCNC: 2.6 G/DL (ref 3.4–5)
ALBUMIN/GLOB SERPL: 0.8 {RATIO} (ref 1–2)
ALP LIVER SERPL-CCNC: 180 U/L
ALT SERPL-CCNC: 36 U/L
ANION GAP SERPL CALC-SCNC: 4 MMOL/L (ref 0–18)
AST SERPL-CCNC: 55 U/L (ref 15–37)
BILIRUB SERPL-MCNC: 0.3 MG/DL (ref 0.1–2)
BUN BLD-MCNC: 10 MG/DL (ref 7–18)
CALCIUM BLD-MCNC: 9.6 MG/DL (ref 8.5–10.1)
CHLORIDE SERPL-SCNC: 108 MMOL/L (ref 98–112)
CO2 SERPL-SCNC: 22 MMOL/L (ref 21–32)
CREAT BLD-MCNC: 0.56 MG/DL
CREAT UR-SCNC: 25.5 MG/DL
GFR SERPLBLD BASED ON 1.73 SQ M-ARVRAT: 124 ML/MIN/1.73M2 (ref 60–?)
GLOBULIN PLAS-MCNC: 3.3 G/DL (ref 2.8–4.4)
GLUCOSE BLD-MCNC: 126 MG/DL (ref 70–99)
OSMOLALITY SERPL CALC.SUM OF ELEC: 279 MOSM/KG (ref 275–295)
POTASSIUM SERPL-SCNC: 4.8 MMOL/L (ref 3.5–5.1)
PROT SERPL-MCNC: 5.9 G/DL (ref 6.4–8.2)
PROT UR-MCNC: <5 MG/DL
SODIUM SERPL-SCNC: 134 MMOL/L (ref 136–145)
URATE SERPL-MCNC: 4.6 MG/DL

## 2023-06-07 PROCEDURE — 10907ZC DRAINAGE OF AMNIOTIC FLUID, THERAPEUTIC FROM PRODUCTS OF CONCEPTION, VIA NATURAL OR ARTIFICIAL OPENING: ICD-10-PCS | Performed by: OBSTETRICS & GYNECOLOGY

## 2023-06-07 NOTE — PLAN OF CARE
Problem: BIRTH - VAGINAL/ SECTION  Goal: Fetal and maternal status remain reassuring during the birth process  Description: INTERVENTIONS:  - Monitor vital signs  - Monitor fetal heart rate  - Monitor uterine activity  - Monitor labor progression (vaginal delivery)  - DVT prophylaxis (C/S delivery)  - Surgical antibiotic prophylaxis (C/S delivery)  Outcome: Progressing     Problem: PAIN - ADULT  Goal: Verbalizes/displays adequate comfort level or patient's stated pain goal  Description: INTERVENTIONS:  - Encourage pt to monitor pain and request assistance  - Assess pain using appropriate pain scale  - Administer analgesics based on type and severity of pain and evaluate response  - Implement non-pharmacological measures as appropriate and evaluate response  - Consider cultural and social influences on pain and pain management  - Manage/alleviate anxiety  - Utilize distraction and/or relaxation techniques  - Monitor for opioid side effects  - Notify MD/LIP if interventions unsuccessful or patient reports new pain  - Anticipate increased pain with activity and pre-medicate as appropriate  Outcome: Progressing     Problem: ANXIETY  Goal: Will report anxiety at manageable levels  Description: INTERVENTIONS:  - Administer medication as ordered  - Teach and rehearse alternative coping skills  - Provide emotional support with 1:1 interaction with staff  Outcome: Progressing     Problem: Patient/Family Goals  Goal: Patient/Family Long Term Goal  Description: Patient's Long Term Goal:  Healthy safe delivery    Interventions:  - See additional Care Plan goals for specific interventions  Outcome: Progressing  Goal: Patient/Family Short Term Goal  Description: Patient's Short Term Goal: Adequate Pain Management    Interventions:   - See additional Care Plan goals for specific interventions  Outcome: Progressing

## 2023-06-07 NOTE — PLAN OF CARE
Problem: BIRTH - VAGINAL/ SECTION  Goal: Fetal and maternal status remain reassuring during the birth process  Description: INTERVENTIONS:  - Monitor vital signs  - Monitor fetal heart rate  - Monitor uterine activity  - Monitor labor progression (vaginal delivery)  - DVT prophylaxis (C/S delivery)  - Surgical antibiotic prophylaxis (C/S delivery)  Outcome: Progressing     Problem: PAIN - ADULT  Goal: Verbalizes/displays adequate comfort level or patient's stated pain goal  Description: INTERVENTIONS:  - Encourage pt to monitor pain and request assistance  - Assess pain using appropriate pain scale  - Administer analgesics based on type and severity of pain and evaluate response  - Implement non-pharmacological measures as appropriate and evaluate response  - Consider cultural and social influences on pain and pain management  - Manage/alleviate anxiety  - Utilize distraction and/or relaxation techniques  - Monitor for opioid side effects  - Notify MD/LIP if interventions unsuccessful or patient reports new pain  - Anticipate increased pain with activity and pre-medicate as appropriate  Outcome: Progressing     Problem: ANXIETY  Goal: Will report anxiety at manageable levels  Description: INTERVENTIONS:  - Administer medication as ordered  - Teach and rehearse alternative coping skills  - Provide emotional support with 1:1 interaction with staff  Outcome: Progressing     Problem: Patient/Family Goals  Goal: Patient/Family Long Term Goal  Description: Patient's Long Term Goal: Uncomplicated vaginal delivery    Interventions:  VS per protocol  I&O  Ice chips and sips as tolerated  EFM per protocol  Maintain IV as ordered  Antibiotics as needed per protocol  Informed consent     - See additional Care Plan goals for specific interventions  Outcome: Progressing  Goal: Patient/Family Short Term Goal  Description: Patient's Short Term Goal: Adequate pain control with delivery of infant    Interventions:  Pain assessment scores as ordered  Patient scores pain a \"3\" or less  Multidisciplinary care   Nonpharmacologic comfort measures   - See additional Care Plan goals for specific interventions  Outcome: Progressing

## 2023-06-08 ENCOUNTER — ANESTHESIA (OUTPATIENT)
Dept: OBGYN UNIT | Facility: HOSPITAL | Age: 33
End: 2023-06-08
Payer: COMMERCIAL

## 2023-06-08 ENCOUNTER — ANESTHESIA EVENT (OUTPATIENT)
Dept: OBGYN UNIT | Facility: HOSPITAL | Age: 33
End: 2023-06-08
Payer: COMMERCIAL

## 2023-06-08 PROCEDURE — 59510 CESAREAN DELIVERY: CPT | Performed by: OBSTETRICS & GYNECOLOGY

## 2023-06-08 PROCEDURE — 59514 CESAREAN DELIVERY ONLY: CPT | Performed by: OBSTETRICS & GYNECOLOGY

## 2023-06-08 PROCEDURE — 3E0P7VZ INTRODUCTION OF HORMONE INTO FEMALE REPRODUCTIVE, VIA NATURAL OR ARTIFICIAL OPENING: ICD-10-PCS | Performed by: OBSTETRICS & GYNECOLOGY

## 2023-06-08 RX ORDER — ENOXAPARIN SODIUM 100 MG/ML
40 INJECTION SUBCUTANEOUS DAILY
Status: DISCONTINUED | OUTPATIENT
Start: 2023-06-08 | End: 2023-06-13

## 2023-06-08 RX ORDER — SODIUM CHLORIDE, SODIUM LACTATE, POTASSIUM CHLORIDE, CALCIUM CHLORIDE 600; 310; 30; 20 MG/100ML; MG/100ML; MG/100ML; MG/100ML
INJECTION, SOLUTION INTRAVENOUS CONTINUOUS PRN
Status: DISCONTINUED | OUTPATIENT
Start: 2023-06-08 | End: 2023-06-08 | Stop reason: SURG

## 2023-06-08 RX ORDER — LIDOCAINE HYDROCHLORIDE AND EPINEPHRINE 20; 5 MG/ML; UG/ML
INJECTION, SOLUTION EPIDURAL; INFILTRATION; INTRACAUDAL; PERINEURAL AS NEEDED
Status: DISCONTINUED | OUTPATIENT
Start: 2023-06-08 | End: 2023-06-08 | Stop reason: SURG

## 2023-06-08 RX ORDER — LIDOCAINE HYDROCHLORIDE AND EPINEPHRINE 15; 5 MG/ML; UG/ML
INJECTION, SOLUTION EPIDURAL AS NEEDED
Status: DISCONTINUED | OUTPATIENT
Start: 2023-06-08 | End: 2023-06-08 | Stop reason: SURG

## 2023-06-08 RX ORDER — DIPHENHYDRAMINE HYDROCHLORIDE 50 MG/ML
25 INJECTION INTRAMUSCULAR; INTRAVENOUS ONCE AS NEEDED
Status: DISCONTINUED | OUTPATIENT
Start: 2023-06-08 | End: 2023-06-08 | Stop reason: HOSPADM

## 2023-06-08 RX ORDER — HYDROMORPHONE HYDROCHLORIDE 1 MG/ML
0.3 INJECTION, SOLUTION INTRAMUSCULAR; INTRAVENOUS; SUBCUTANEOUS EVERY 2 HOUR PRN
Status: DISCONTINUED | OUTPATIENT
Start: 2023-06-08 | End: 2023-06-13

## 2023-06-08 RX ORDER — MORPHINE SULFATE 2 MG/ML
INJECTION, SOLUTION INTRAMUSCULAR; INTRAVENOUS AS NEEDED
Status: DISCONTINUED | OUTPATIENT
Start: 2023-06-08 | End: 2023-06-08 | Stop reason: SURG

## 2023-06-08 RX ORDER — KETOROLAC TROMETHAMINE 30 MG/ML
30 INJECTION, SOLUTION INTRAMUSCULAR; INTRAVENOUS EVERY 6 HOURS
Status: DISPENSED | OUTPATIENT
Start: 2023-06-08 | End: 2023-06-09

## 2023-06-08 RX ORDER — NALBUPHINE HYDROCHLORIDE 10 MG/ML
2.5 INJECTION, SOLUTION INTRAMUSCULAR; INTRAVENOUS; SUBCUTANEOUS
Status: DISCONTINUED | OUTPATIENT
Start: 2023-06-08 | End: 2023-06-13

## 2023-06-08 RX ORDER — SODIUM CHLORIDE, SODIUM LACTATE, POTASSIUM CHLORIDE, CALCIUM CHLORIDE 600; 310; 30; 20 MG/100ML; MG/100ML; MG/100ML; MG/100ML
INJECTION, SOLUTION INTRAVENOUS CONTINUOUS
Status: DISCONTINUED | OUTPATIENT
Start: 2023-06-08 | End: 2023-06-13

## 2023-06-08 RX ORDER — HYDROMORPHONE HYDROCHLORIDE 1 MG/ML
0.4 INJECTION, SOLUTION INTRAMUSCULAR; INTRAVENOUS; SUBCUTANEOUS EVERY 5 MIN PRN
Status: DISCONTINUED | OUTPATIENT
Start: 2023-06-08 | End: 2023-06-08 | Stop reason: HOSPADM

## 2023-06-08 RX ORDER — GABAPENTIN 300 MG/1
300 CAPSULE ORAL EVERY 8 HOURS PRN
Status: DISCONTINUED | OUTPATIENT
Start: 2023-06-08 | End: 2023-06-13

## 2023-06-08 RX ORDER — DEXTROSE, SODIUM CHLORIDE, SODIUM LACTATE, POTASSIUM CHLORIDE, AND CALCIUM CHLORIDE 5; .6; .31; .03; .02 G/100ML; G/100ML; G/100ML; G/100ML; G/100ML
INJECTION, SOLUTION INTRAVENOUS CONTINUOUS PRN
Status: DISCONTINUED | OUTPATIENT
Start: 2023-06-08 | End: 2023-06-13

## 2023-06-08 RX ORDER — BISACODYL 10 MG
10 SUPPOSITORY, RECTAL RECTAL ONCE AS NEEDED
Status: DISCONTINUED | OUTPATIENT
Start: 2023-06-08 | End: 2023-06-13

## 2023-06-08 RX ORDER — MIDAZOLAM HYDROCHLORIDE 1 MG/ML
INJECTION INTRAMUSCULAR; INTRAVENOUS AS NEEDED
Status: DISCONTINUED | OUTPATIENT
Start: 2023-06-08 | End: 2023-06-08 | Stop reason: SURG

## 2023-06-08 RX ORDER — KETOROLAC TROMETHAMINE 30 MG/ML
30 INJECTION, SOLUTION INTRAMUSCULAR; INTRAVENOUS ONCE AS NEEDED
Status: COMPLETED | OUTPATIENT
Start: 2023-06-08 | End: 2023-06-08

## 2023-06-08 RX ORDER — CEFAZOLIN SODIUM/WATER 2 G/20 ML
2 SYRINGE (ML) INTRAVENOUS ONCE
Status: COMPLETED | OUTPATIENT
Start: 2023-06-08 | End: 2023-06-08

## 2023-06-08 RX ORDER — IBUPROFEN 600 MG/1
600 TABLET ORAL EVERY 6 HOURS
Status: DISCONTINUED | OUTPATIENT
Start: 2023-06-09 | End: 2023-06-13

## 2023-06-08 RX ORDER — NALBUPHINE HYDROCHLORIDE 10 MG/ML
2.5 INJECTION, SOLUTION INTRAMUSCULAR; INTRAVENOUS; SUBCUTANEOUS
Status: DISCONTINUED | OUTPATIENT
Start: 2023-06-08 | End: 2023-06-08

## 2023-06-08 RX ORDER — BUPIVACAINE HCL/0.9 % NACL/PF 0.25 %
5 PLASTIC BAG, INJECTION (ML) EPIDURAL AS NEEDED
Status: DISCONTINUED | OUTPATIENT
Start: 2023-06-08 | End: 2023-06-08

## 2023-06-08 RX ORDER — ONDANSETRON 2 MG/ML
4 INJECTION INTRAMUSCULAR; INTRAVENOUS EVERY 6 HOURS PRN
Status: DISCONTINUED | OUTPATIENT
Start: 2023-06-08 | End: 2023-06-13

## 2023-06-08 RX ORDER — SIMETHICONE 80 MG
80 TABLET,CHEWABLE ORAL 3 TIMES DAILY PRN
Status: DISCONTINUED | OUTPATIENT
Start: 2023-06-08 | End: 2023-06-13

## 2023-06-08 RX ORDER — KETOROLAC TROMETHAMINE 30 MG/ML
INJECTION, SOLUTION INTRAMUSCULAR; INTRAVENOUS
Status: COMPLETED
Start: 2023-06-08 | End: 2023-06-08

## 2023-06-08 RX ORDER — ONDANSETRON 2 MG/ML
4 INJECTION INTRAMUSCULAR; INTRAVENOUS ONCE AS NEEDED
Status: DISCONTINUED | OUTPATIENT
Start: 2023-06-08 | End: 2023-06-08 | Stop reason: HOSPADM

## 2023-06-08 RX ORDER — OXYCODONE HYDROCHLORIDE 5 MG/1
5 TABLET ORAL EVERY 6 HOURS PRN
Status: DISCONTINUED | OUTPATIENT
Start: 2023-06-08 | End: 2023-06-13

## 2023-06-08 RX ORDER — LIDOCAINE HYDROCHLORIDE 10 MG/ML
INJECTION, SOLUTION EPIDURAL; INFILTRATION; INTRACAUDAL; PERINEURAL AS NEEDED
Status: DISCONTINUED | OUTPATIENT
Start: 2023-06-08 | End: 2023-06-08 | Stop reason: SURG

## 2023-06-08 RX ORDER — ACETAMINOPHEN 500 MG
1000 TABLET ORAL EVERY 6 HOURS
Status: DISCONTINUED | OUTPATIENT
Start: 2023-06-08 | End: 2023-06-13

## 2023-06-08 RX ORDER — DOCUSATE SODIUM 100 MG/1
100 CAPSULE, LIQUID FILLED ORAL
Status: DISCONTINUED | OUTPATIENT
Start: 2023-06-08 | End: 2023-06-13

## 2023-06-08 RX ADMIN — LIDOCAINE HYDROCHLORIDE 3 ML: 10 INJECTION, SOLUTION EPIDURAL; INFILTRATION; INTRACAUDAL; PERINEURAL at 01:20:00

## 2023-06-08 RX ADMIN — MIDAZOLAM HYDROCHLORIDE 2 MG: 1 INJECTION INTRAMUSCULAR; INTRAVENOUS at 10:26:00

## 2023-06-08 RX ADMIN — CEFAZOLIN SODIUM/WATER 2 G: 2 G/20 ML SYRINGE (ML) INTRAVENOUS at 09:45:00

## 2023-06-08 RX ADMIN — LIDOCAINE HYDROCHLORIDE AND EPINEPHRINE 3 ML: 15; 5 INJECTION, SOLUTION EPIDURAL at 01:22:00

## 2023-06-08 RX ADMIN — LIDOCAINE HYDROCHLORIDE AND EPINEPHRINE 5 ML: 20; 5 INJECTION, SOLUTION EPIDURAL; INFILTRATION; INTRACAUDAL; PERINEURAL at 10:07:00

## 2023-06-08 RX ADMIN — LIDOCAINE HYDROCHLORIDE AND EPINEPHRINE 10 ML: 20; 5 INJECTION, SOLUTION EPIDURAL; INFILTRATION; INTRACAUDAL; PERINEURAL at 09:45:00

## 2023-06-08 RX ADMIN — SODIUM CHLORIDE, SODIUM LACTATE, POTASSIUM CHLORIDE, CALCIUM CHLORIDE: 600; 310; 30; 20 INJECTION, SOLUTION INTRAVENOUS at 09:45:00

## 2023-06-08 RX ADMIN — MORPHINE SULFATE 4 MG: 2 INJECTION, SOLUTION INTRAMUSCULAR; INTRAVENOUS at 09:45:00

## 2023-06-08 NOTE — PLAN OF CARE
Problem: BIRTH - VAGINAL/ SECTION  Goal: Fetal and maternal status remain reassuring during the birth process  Description: INTERVENTIONS:  - Monitor vital signs  - Monitor fetal heart rate  - Monitor uterine activity  - Monitor labor progression (vaginal delivery)  - DVT prophylaxis (C/S delivery)  - Surgical antibiotic prophylaxis (C/S delivery)  Outcome: Progressing     Problem: PAIN - ADULT  Goal: Verbalizes/displays adequate comfort level or patient's stated pain goal  Description: INTERVENTIONS:  - Encourage pt to monitor pain and request assistance  - Assess pain using appropriate pain scale  - Administer analgesics based on type and severity of pain and evaluate response  - Implement non-pharmacological measures as appropriate and evaluate response  - Consider cultural and social influences on pain and pain management  - Manage/alleviate anxiety  - Utilize distraction and/or relaxation techniques  - Monitor for opioid side effects  - Notify MD/LIP if interventions unsuccessful or patient reports new pain  - Anticipate increased pain with activity and pre-medicate as appropriate  Outcome: Progressing     Problem: ANXIETY  Goal: Will report anxiety at manageable levels  Description: INTERVENTIONS:  - Administer medication as ordered  - Teach and rehearse alternative coping skills  - Provide emotional support with 1:1 interaction with staff  Outcome: Progressing     Problem: Patient/Family Goals  Goal: Patient/Family Long Term Goal  Description: Patient's Long Term Goal: Adequate pain control    Interventions:  - See additional Care Plan goals for specific interventions  Outcome: Progressing  Goal: Patient/Family Short Term Goal  Description: Patient's Short Term Goal: safe delivery of infant    Interventions:   - See additional Care Plan goals for specific interventions  Outcome: Progressing     Problem: SAFETY ADULT - FALL  Goal: Free from fall injury  Description: INTERVENTIONS:  - Assess pt frequently for physical needs  - Identify cognitive and physical deficits and behaviors that affect risk of falls.   - [place] fall precautions as indicated by assessment.  - Educate pt/family on patient safety including physical limitations  - Instruct pt to call for assistance with activity based on assessment  - Modify environment to reduce risk of injury  - Provide assistive devices as appropriate  - Consider OT/PT consult to assist with strengthening/mobility  - Encourage toileting schedule  Outcome: Progressing

## 2023-06-08 NOTE — ANESTHESIA PROCEDURE NOTES
Labor Analgesia    Date/Time: 6/8/2023 1:15 AM    Performed by: Luis Eduardo Romero MD  Authorized by: Luis Eduardo Romero MD      General Information and Staff    Start Time:  6/8/2023 1:15 AM  End Time:  6/8/2023 1:22 AM  Anesthesiologist:  Luis Eduardo Romero MD  Performed by:   Anesthesiologist  Patient Location:  OB  Site Identification: surface landmarks    Reason for Block: labor epidural    Preanesthetic Checklist: patient identified, IV checked, risks and benefits discussed, monitors and equipment checked, pre-op evaluation, timeout performed, IV bolus, anesthesia consent and sterile technique used      Procedure Details    Patient Position:  Sitting  Prep: ChloraPrep    Monitoring:  Heart rate and continuous pulse ox  Approach:  Midline    Epidural Needle    Injection Technique:  MAXIME saline  Needle Type:  Tuohy  Needle Gauge:  17 G  Needle Length:  3.375 in  Needle Insertion Depth:  6  Location:  L3-4    Spinal Needle      Catheter    Catheter Type:  End hole  Catheter Size:  19 G  Catheter at Skin Depth:  10  Test Dose:  Negative    Assessment      Additional Comments

## 2023-06-08 NOTE — PROGRESS NOTES
Patient transferred to mother/baby room 2208 per cart in stable condition with baby and personal belongings. Accompanied by  and staff. Report given to [employer].

## 2023-06-08 NOTE — PLAN OF CARE
Problem: BIRTH - VAGINAL/ SECTION  Goal: Fetal and maternal status remain reassuring during the birth process  Description: INTERVENTIONS:  - Monitor vital signs  - Monitor fetal heart rate  - Monitor uterine activity  - Monitor labor progression (vaginal delivery)  - DVT prophylaxis (C/S delivery)  - Surgical antibiotic prophylaxis (C/S delivery)  Outcome: Progressing     Problem: PAIN - ADULT  Goal: Verbalizes/displays adequate comfort level or patient's stated pain goal  Description: INTERVENTIONS:  - Encourage pt to monitor pain and request assistance  - Assess pain using appropriate pain scale  - Administer analgesics based on type and severity of pain and evaluate response  - Implement non-pharmacological measures as appropriate and evaluate response  - Consider cultural and social influences on pain and pain management  - Manage/alleviate anxiety  - Utilize distraction and/or relaxation techniques  - Monitor for opioid side effects  - Notify MD/LIP if interventions unsuccessful or patient reports new pain  - Anticipate increased pain with activity and pre-medicate as appropriate  Outcome: Progressing     Problem: ANXIETY  Goal: Will report anxiety at manageable levels  Description: INTERVENTIONS:  - Administer medication as ordered  - Teach and rehearse alternative coping skills  - Provide emotional support with 1:1 interaction with staff  Outcome: Progressing     Problem: Patient/Family Goals  Goal: Patient/Family Long Term Goal  Description: Patient's Long Term Goal: Uncomplicated vaginal delivery     Interventions:   VS per protocol   I&O   Ice chips and sips as tolerated   EFM per protocol   Maintain IV as ordered   Antibiotics as needed per protocol   Informed consent    Outcome: Progressing  Goal: Patient/Family Short Term Goal  Description: Patient's Short Term Goal: Adequate pain control with delivery of infant  Interventions:  Pain assessment scores as ordered  Patient scores pain a \"3\" or less  Multidisciplinary care   Nonpharmacologic comfort measures   Outcome: Progressing

## 2023-06-09 PROBLEM — R60.0 BILATERAL LOWER EXTREMITY EDEMA: Status: ACTIVE | Noted: 2023-06-09

## 2023-06-09 LAB
BASOPHILS # BLD AUTO: 0.05 X10(3) UL (ref 0–0.2)
BASOPHILS NFR BLD AUTO: 0.4 %
EOSINOPHIL # BLD AUTO: 0.15 X10(3) UL (ref 0–0.7)
EOSINOPHIL NFR BLD AUTO: 1.1 %
ERYTHROCYTE [DISTWIDTH] IN BLOOD BY AUTOMATED COUNT: 12.3 %
HCT VFR BLD AUTO: 35.5 %
HGB BLD-MCNC: 12 G/DL
IMM GRANULOCYTES # BLD AUTO: 0.14 X10(3) UL (ref 0–1)
IMM GRANULOCYTES NFR BLD: 1 %
LYMPHOCYTES # BLD AUTO: 1.92 X10(3) UL (ref 1–4)
LYMPHOCYTES NFR BLD AUTO: 13.6 %
MCH RBC QN AUTO: 31.5 PG (ref 26–34)
MCHC RBC AUTO-ENTMCNC: 33.8 G/DL (ref 31–37)
MCV RBC AUTO: 93.2 FL
MONOCYTES # BLD AUTO: 0.9 X10(3) UL (ref 0.1–1)
MONOCYTES NFR BLD AUTO: 6.4 %
NEUTROPHILS # BLD AUTO: 10.92 X10 (3) UL (ref 1.5–7.7)
NEUTROPHILS # BLD AUTO: 10.92 X10(3) UL (ref 1.5–7.7)
NEUTROPHILS NFR BLD AUTO: 77.5 %
PLATELET # BLD AUTO: 250 10(3)UL (ref 150–450)
RBC # BLD AUTO: 3.81 X10(6)UL
WBC # BLD AUTO: 14.1 X10(3) UL (ref 4–11)

## 2023-06-09 RX ORDER — FUROSEMIDE 20 MG/1
20 TABLET ORAL DAILY PRN
Status: DISCONTINUED | OUTPATIENT
Start: 2023-06-09 | End: 2023-06-12

## 2023-06-09 NOTE — DISCHARGE INSTRUCTIONS
Nothing in vagina for 6 weeks. AVOID CONSTIPATION:   -Take Miralax one capful in water or juice each morning. You can also take each evening iif needed.  -Take Fiber supplement along with Miralax as well. -May also take milk of magnesia or Dulcolax over the counter if needing to have a BM more urgently than Miralax is providing    -Do NOT strain for bowel movements    No strenuous activity/exercise  No tub baths. No lifting over 10 pounds (1 gallon of mild is 8 pounds) for 6 weeks   May shower immediately  Keep wound(s) clean and dry. Wash daily with warm water & soap. Do not scrub. Gently pat dry. May cover with clean gauze or pad if needed. Can wash with Hibiclens over the abdomen/torso to help decrease bacterial colonization if you prefer. It is alcohol-based so it can be somewhat drying. You may ride in a car immediately. You may drive after 1-2 weeks. Must not drive if taking a narcotic (e.g. Norco, Oxycodone) or gabapentin as these are sedating. Nothing in the vagina or 6 weeks     Please call office if:  -fever 100.4 or higher    Please proceed to the Emergency Department at [facility] for any of the following:   -vaginal bleeding soaking greater than 1 pad per hour  -severe pelvic pain  -shortness of breath  -chest pain  -leg pain or swelling  -persistent or severe headache  -vision changes  -persistent or severe upper abdominal pain  -feeling depressed or extremely anxious  -thoughts of self harm or harming infant(s).

## 2023-06-09 NOTE — PLAN OF CARE
Problem: POSTPARTUM  Goal: Long Term Goal:Experiences normal postpartum course  Description: INTERVENTIONS:  - Assess and monitor vital signs and lab values. - Assess fundus and lochia. - Provide ice/sitz baths for perineum discomfort. - Monitor healing of incision/episiotomy/laceration, and assess for signs and symptoms of infection and hematoma. - Assess bladder function and monitor for bladder distention.  - Provide/instruct/assist with pericare as needed. - Provide VTE prophylaxis as needed. - Monitor bowel function.  - Encourage ambulation and provide assistance as needed. - Assess and monitor emotional status and provide social service/psych resources as needed. - Utilize standard precautions and use personal protective equipment as indicated. Ensure aseptic care of all intravenous lines and invasive tubes/drains.  - Obtain immunization and exposure to communicable diseases history. 6/8/2023 1948 by Tammy Farnsworth RN  Outcome: Progressing  Goal: Optimize infant feeding at the breast  Description: INTERVENTIONS:  - Initiate breast feeding within first hour after birth. - Monitor effectiveness of current breast feeding efforts. - Assess support systems available to mother/family.  - Identify cultural beliefs/practices regarding lactation, letdown techniques, maternal food preferences. - Assess mother's knowledge and previous experience with breast feeding.  - Provide information as needed about early infant feeding cues (e.g., rooting, lip smacking, sucking fingers/hand) versus late cue of crying.  - Discuss/demonstrate breast feeding aids (e.g., infant sling, nursing footstool/pillows, and breast pumps). - Encourage mother/other family members to express feelings/concerns, and actively listen. - Educate father/SO about benefits of breast feeding and how to manage common lactation challenges.   - Recommend avoidance of specific medications or substances incompatible with breast feeding.  - Assess and monitor for signs of nipple pain/trauma. - Instruct and provide assistance with proper latch. - Review techniques for milk expression (breast pumping) and storage of breast milk. Provide pumping equipment/supplies, instructions and assistance, as needed. - Encourage rooming-in and breast feeding on demand.  - Encourage skin-to-skin contact. - Provide LC support as needed. - Assess for and manage engorgement. - Provide breast feeding education handouts and information on community breast feeding support. 2023 by Elvis Dodd RN  Outcome: Progressing  Goal: Establishment of adequate milk supply with medication/procedure interruptions  Description: INTERVENTIONS:  - Review techniques for milk expression (breast pumping). - Provide pumping equipment/supplies, instructions, and assistance until it is safe to breastfeed infant. 2023 by Elvis Dodd RN  Outcome: Progressing  Goal: Appropriate maternal -  bonding  Description: INTERVENTIONS:  - Assess caregiver- interactions. - Assess caregiver's emotional status and coping mechanisms. - Encourage caregiver to participate in  daily care. - Assess support systems available to mother/family.  - Provide /case management support as needed.   2023 by Evlis Dodd RN  Outcome: Progressing

## 2023-06-10 LAB
ALBUMIN SERPL-MCNC: 1.9 G/DL (ref 3.4–5)
ALBUMIN/GLOB SERPL: 0.6 {RATIO} (ref 1–2)
ALP LIVER SERPL-CCNC: 118 U/L
ALT SERPL-CCNC: 23 U/L
ANION GAP SERPL CALC-SCNC: 4 MMOL/L (ref 0–18)
AST SERPL-CCNC: 34 U/L (ref 15–37)
BASOPHILS # BLD AUTO: 0.03 X10(3) UL (ref 0–0.2)
BASOPHILS NFR BLD AUTO: 0.3 %
BILIRUB SERPL-MCNC: 0.1 MG/DL (ref 0.1–2)
BUN BLD-MCNC: 11 MG/DL (ref 7–18)
CALCIUM BLD-MCNC: 8.6 MG/DL (ref 8.5–10.1)
CHLORIDE SERPL-SCNC: 109 MMOL/L (ref 98–112)
CO2 SERPL-SCNC: 26 MMOL/L (ref 21–32)
CREAT BLD-MCNC: 0.61 MG/DL
EOSINOPHIL # BLD AUTO: 0.17 X10(3) UL (ref 0–0.7)
EOSINOPHIL NFR BLD AUTO: 1.5 %
ERYTHROCYTE [DISTWIDTH] IN BLOOD BY AUTOMATED COUNT: 12.3 %
GFR SERPLBLD BASED ON 1.73 SQ M-ARVRAT: 122 ML/MIN/1.73M2 (ref 60–?)
GLOBULIN PLAS-MCNC: 3 G/DL (ref 2.8–4.4)
GLUCOSE BLD-MCNC: 129 MG/DL (ref 70–99)
HCT VFR BLD AUTO: 31.9 %
HGB BLD-MCNC: 10.7 G/DL
IMM GRANULOCYTES # BLD AUTO: 0.12 X10(3) UL (ref 0–1)
IMM GRANULOCYTES NFR BLD: 1 %
LYMPHOCYTES # BLD AUTO: 1.75 X10(3) UL (ref 1–4)
LYMPHOCYTES NFR BLD AUTO: 15.3 %
MCH RBC QN AUTO: 31.8 PG (ref 26–34)
MCHC RBC AUTO-ENTMCNC: 33.5 G/DL (ref 31–37)
MCV RBC AUTO: 94.7 FL
MONOCYTES # BLD AUTO: 0.7 X10(3) UL (ref 0.1–1)
MONOCYTES NFR BLD AUTO: 6.1 %
NEUTROPHILS # BLD AUTO: 8.67 X10 (3) UL (ref 1.5–7.7)
NEUTROPHILS # BLD AUTO: 8.67 X10(3) UL (ref 1.5–7.7)
NEUTROPHILS NFR BLD AUTO: 75.8 %
OSMOLALITY SERPL CALC.SUM OF ELEC: 289 MOSM/KG (ref 275–295)
PLATELET # BLD AUTO: 248 10(3)UL (ref 150–450)
POTASSIUM SERPL-SCNC: 3.6 MMOL/L (ref 3.5–5.1)
PROT SERPL-MCNC: 4.9 G/DL (ref 6.4–8.2)
RBC # BLD AUTO: 3.37 X10(6)UL
SODIUM SERPL-SCNC: 139 MMOL/L (ref 136–145)
URATE SERPL-MCNC: 5.6 MG/DL
WBC # BLD AUTO: 11.4 X10(3) UL (ref 4–11)

## 2023-06-10 RX ORDER — POTASSIUM CHLORIDE 20 MEQ/1
20 TABLET, EXTENDED RELEASE ORAL 2 TIMES DAILY
Status: DISCONTINUED | OUTPATIENT
Start: 2023-06-10 | End: 2023-06-13

## 2023-06-10 RX ORDER — FUROSEMIDE 20 MG/1
20 TABLET ORAL
Status: DISCONTINUED | OUTPATIENT
Start: 2023-06-10 | End: 2023-06-12

## 2023-06-11 PROBLEM — R79.89 ELEVATED LFTS: Status: ACTIVE | Noted: 2023-06-07

## 2023-06-11 LAB
ALBUMIN SERPL-MCNC: 1.9 G/DL (ref 3.4–5)
ALBUMIN/GLOB SERPL: 0.6 {RATIO} (ref 1–2)
ALP LIVER SERPL-CCNC: 118 U/L
ALT SERPL-CCNC: 58 U/L
ANION GAP SERPL CALC-SCNC: 4 MMOL/L (ref 0–18)
AST SERPL-CCNC: 65 U/L (ref 15–37)
BILIRUB SERPL-MCNC: 0.1 MG/DL (ref 0.1–2)
BUN BLD-MCNC: 10 MG/DL (ref 7–18)
CALCIUM BLD-MCNC: 8.6 MG/DL (ref 8.5–10.1)
CHLORIDE SERPL-SCNC: 111 MMOL/L (ref 98–112)
CO2 SERPL-SCNC: 26 MMOL/L (ref 21–32)
CREAT BLD-MCNC: 0.52 MG/DL
GFR SERPLBLD BASED ON 1.73 SQ M-ARVRAT: 127 ML/MIN/1.73M2 (ref 60–?)
GLOBULIN PLAS-MCNC: 3.2 G/DL (ref 2.8–4.4)
GLUCOSE BLD-MCNC: 84 MG/DL (ref 70–99)
OSMOLALITY SERPL CALC.SUM OF ELEC: 290 MOSM/KG (ref 275–295)
POTASSIUM SERPL-SCNC: 4.1 MMOL/L (ref 3.5–5.1)
PROT SERPL-MCNC: 5.1 G/DL (ref 6.4–8.2)
SODIUM SERPL-SCNC: 141 MMOL/L (ref 136–145)

## 2023-06-12 ENCOUNTER — APPOINTMENT (OUTPATIENT)
Dept: ULTRASOUND IMAGING | Facility: HOSPITAL | Age: 33
End: 2023-06-12
Attending: OBSTETRICS & GYNECOLOGY
Payer: COMMERCIAL

## 2023-06-12 LAB
ALBUMIN SERPL-MCNC: 2.4 G/DL (ref 3.4–5)
ALBUMIN/GLOB SERPL: 0.6 {RATIO} (ref 1–2)
ALP LIVER SERPL-CCNC: 138 U/L
ALT SERPL-CCNC: 143 U/L
ANION GAP SERPL CALC-SCNC: 3 MMOL/L (ref 0–18)
AST SERPL-CCNC: 124 U/L (ref 15–37)
BASOPHILS # BLD AUTO: 0.06 X10(3) UL (ref 0–0.2)
BASOPHILS NFR BLD AUTO: 0.6 %
BILIRUB SERPL-MCNC: 0.2 MG/DL (ref 0.1–2)
BUN BLD-MCNC: 9 MG/DL (ref 7–18)
CALCIUM BLD-MCNC: 9.7 MG/DL (ref 8.5–10.1)
CHLORIDE SERPL-SCNC: 108 MMOL/L (ref 98–112)
CO2 SERPL-SCNC: 27 MMOL/L (ref 21–32)
CREAT BLD-MCNC: 0.56 MG/DL
EOSINOPHIL # BLD AUTO: 0.26 X10(3) UL (ref 0–0.7)
EOSINOPHIL NFR BLD AUTO: 2.7 %
ERYTHROCYTE [DISTWIDTH] IN BLOOD BY AUTOMATED COUNT: 12 %
GFR SERPLBLD BASED ON 1.73 SQ M-ARVRAT: 124 ML/MIN/1.73M2 (ref 60–?)
GLOBULIN PLAS-MCNC: 4 G/DL (ref 2.8–4.4)
GLUCOSE BLD-MCNC: 79 MG/DL (ref 70–99)
HCT VFR BLD AUTO: 38.3 %
HGB BLD-MCNC: 12.6 G/DL
IMM GRANULOCYTES # BLD AUTO: 0.2 X10(3) UL (ref 0–1)
IMM GRANULOCYTES NFR BLD: 2.1 %
LYMPHOCYTES # BLD AUTO: 1.87 X10(3) UL (ref 1–4)
LYMPHOCYTES NFR BLD AUTO: 19.3 %
MCH RBC QN AUTO: 31.1 PG (ref 26–34)
MCHC RBC AUTO-ENTMCNC: 32.9 G/DL (ref 31–37)
MCV RBC AUTO: 94.6 FL
MONOCYTES # BLD AUTO: 0.62 X10(3) UL (ref 0.1–1)
MONOCYTES NFR BLD AUTO: 6.4 %
NEUTROPHILS # BLD AUTO: 6.66 X10 (3) UL (ref 1.5–7.7)
NEUTROPHILS # BLD AUTO: 6.66 X10(3) UL (ref 1.5–7.7)
NEUTROPHILS NFR BLD AUTO: 68.9 %
OSMOLALITY SERPL CALC.SUM OF ELEC: 284 MOSM/KG (ref 275–295)
PLATELET # BLD AUTO: 362 10(3)UL (ref 150–450)
POTASSIUM SERPL-SCNC: 4.2 MMOL/L (ref 3.5–5.1)
PROT SERPL-MCNC: 6.4 G/DL (ref 6.4–8.2)
RBC # BLD AUTO: 4.05 X10(6)UL
SODIUM SERPL-SCNC: 138 MMOL/L (ref 136–145)
WBC # BLD AUTO: 9.7 X10(3) UL (ref 4–11)

## 2023-06-12 PROCEDURE — 76700 US EXAM ABDOM COMPLETE: CPT | Performed by: OBSTETRICS & GYNECOLOGY

## 2023-06-13 ENCOUNTER — TELEPHONE (OUTPATIENT)
Dept: OBGYN CLINIC | Facility: CLINIC | Age: 33
End: 2023-06-13

## 2023-06-13 VITALS
WEIGHT: 221 LBS | HEART RATE: 101 BPM | SYSTOLIC BLOOD PRESSURE: 120 MMHG | DIASTOLIC BLOOD PRESSURE: 84 MMHG | OXYGEN SATURATION: 93 % | BODY MASS INDEX: 41.72 KG/M2 | HEIGHT: 61 IN | RESPIRATION RATE: 18 BRPM | TEMPERATURE: 99 F

## 2023-06-13 PROBLEM — O99.210 OBESITY AFFECTING PREGNANCY, ANTEPARTUM (HCC): Status: RESOLVED | Noted: 2023-03-22 | Resolved: 2023-06-13

## 2023-06-13 PROBLEM — O99.210 OBESITY AFFECTING PREGNANCY, ANTEPARTUM: Status: RESOLVED | Noted: 2023-03-22 | Resolved: 2023-06-13

## 2023-06-13 PROBLEM — Z34.90 PREGNANCY: Status: RESOLVED | Noted: 2023-06-06 | Resolved: 2023-06-13

## 2023-06-13 PROBLEM — Z34.90 PREGNANCY (HCC): Status: RESOLVED | Noted: 2023-06-06 | Resolved: 2023-06-13

## 2023-06-13 LAB
ALBUMIN SERPL-MCNC: 2.4 G/DL (ref 3.4–5)
ALBUMIN/GLOB SERPL: 0.6 {RATIO} (ref 1–2)
ALP LIVER SERPL-CCNC: 135 U/L
ALT SERPL-CCNC: 120 U/L
ANION GAP SERPL CALC-SCNC: 6 MMOL/L (ref 0–18)
AST SERPL-CCNC: 75 U/L (ref 15–37)
BILIRUB SERPL-MCNC: 0.3 MG/DL (ref 0.1–2)
BUN BLD-MCNC: 9 MG/DL (ref 7–18)
CALCIUM BLD-MCNC: 9.3 MG/DL (ref 8.5–10.1)
CHLORIDE SERPL-SCNC: 107 MMOL/L (ref 98–112)
CO2 SERPL-SCNC: 25 MMOL/L (ref 21–32)
CREAT BLD-MCNC: 0.51 MG/DL
ERYTHROCYTE [DISTWIDTH] IN BLOOD BY AUTOMATED COUNT: 11.9 %
GFR SERPLBLD BASED ON 1.73 SQ M-ARVRAT: 127 ML/MIN/1.73M2 (ref 60–?)
GLOBULIN PLAS-MCNC: 3.8 G/DL (ref 2.8–4.4)
GLUCOSE BLD-MCNC: 82 MG/DL (ref 70–99)
HCT VFR BLD AUTO: 37.8 %
HGB BLD-MCNC: 12.6 G/DL
MCH RBC QN AUTO: 31.2 PG (ref 26–34)
MCHC RBC AUTO-ENTMCNC: 33.3 G/DL (ref 31–37)
MCV RBC AUTO: 93.6 FL
OSMOLALITY SERPL CALC.SUM OF ELEC: 284 MOSM/KG (ref 275–295)
PLATELET # BLD AUTO: 389 10(3)UL (ref 150–450)
POTASSIUM SERPL-SCNC: 4.4 MMOL/L (ref 3.5–5.1)
PROT SERPL-MCNC: 6.2 G/DL (ref 6.4–8.2)
RBC # BLD AUTO: 4.04 X10(6)UL
SODIUM SERPL-SCNC: 138 MMOL/L (ref 136–145)
WBC # BLD AUTO: 10.7 X10(3) UL (ref 4–11)

## 2023-06-13 RX ORDER — GABAPENTIN 300 MG/1
300 CAPSULE ORAL 3 TIMES DAILY PRN
Qty: 45 CAPSULE | Refills: 1 | Status: SHIPPED | OUTPATIENT
Start: 2023-06-13

## 2023-06-13 NOTE — TELEPHONE ENCOUNTER
Pt called, she need an appt for bp ck this Friday per Dr. Destiny Boyle. She wants to go to [facility] office. She has an appt next door at 12:30 pm at [facility] for 2 hrs session. Pt can do before 12:30 or 3 pm. Please call pt back.

## 2023-06-13 NOTE — TELEPHONE ENCOUNTER
S/p elective IOL with PLTCS on 6/8/23  Elevated BP readings in and after labor  No meds inpatient or ordered for home    Discharged today and instructed to f/u in 3-5 days for BP check    Contacted patient. Assisted with scheduling appt.

## 2023-06-13 NOTE — DISCHARGE SUMMARY
[facility]    Discharge Summary    Honore Sa Bonnevier Patient Status:  Inpatient    10/12/1990 MRN [id number]   [facility] 2SW-J Attending Dali Betancourt MD   [facility] Day # 7 PCP SHERLYN Fernandez     Admit Date: 2023    Discharge Date : 23      Attending Physician: Dali Betancourt MD    Reason for Admission:  Labor induction    Procedures:  Primary  section for failed IOL    Hospital Course: 28year old  admitted at 39w2d for elective IOL. Labor progress prolonged, did not make cervical change over 14 hours despite pitocin and ruptured membranes. Uncomplicated PCS for delivery of 7lb 9oz male infant with APGARS 8/9. Pt had mild range Bps in labor on/off, had improved postpartum however LFTs mildly elevated and when CMP repeated postpartum were noted to increase. Pt remained admitted while trending LFTs and monitoring Bps. Bps remained controlled and LFTs started to trend down on POD5 so pt discharged home with plan for BP check within 3-5d of discharge    Discharge Diagnoses: s/p primary  section, gestational hypertension, transaminitis    Discharged Condition: improved    Disposition: home    Patient Instructions:   Follow-up appointment with [facility] in 3-5 days for BP check    Rach Moore MD  2023  10:28 AM

## 2023-06-15 ENCOUNTER — TELEPHONE (OUTPATIENT)
Dept: OBGYN UNIT | Facility: HOSPITAL | Age: 33
End: 2023-06-15

## 2023-06-15 NOTE — PROGRESS NOTES
Reviewed self and infant care w / mom, she verbalizes understanding of instructions reviewed. Encourage to follow up w/ MDs as directed and w/ questions/concerns.  Trish reviewed and has one fu appt tomorrow, and enc to join ct

## 2023-06-16 ENCOUNTER — NURSE ONLY (OUTPATIENT)
Dept: OBGYN CLINIC | Facility: CLINIC | Age: 33
End: 2023-06-16
Payer: COMMERCIAL

## 2023-06-16 VITALS — SYSTOLIC BLOOD PRESSURE: 118 MMHG | DIASTOLIC BLOOD PRESSURE: 70 MMHG

## 2023-06-16 NOTE — PATIENT INSTRUCTIONS
Please call the office if you have any blood pressures greater than 140/90, have a persistent headache that is not improved with rest/fluids/food/over the counter pain medication, vision changes, pain in your right upper side, or unusual/new swelling.

## 2023-06-16 NOTE — PROGRESS NOTES
S/p elective IOL with PLTCS on 6/8/23  Elevated BP readings in and after labor  No meds inpatient or ordered for home        Incision check scheduled for 6/22/23 and PP visit scheduled for 7/19/23    Home BP readings    6/13  1900 122/86    6/14  0900 118/79  1600 114/85  1900 118/85    6/15  1000 124/89  1400 116/88  1900 121/87    6/16  0800 111/78    Mild intermittent headaches that resolve without medication. Denies unusual swelling, vision changes, or RUQ pain. BP in office today normotensive. Precautions reviewed and advised to keep appt next week. Patient states understanding.

## 2023-06-30 ENCOUNTER — POSTPARTUM (OUTPATIENT)
Dept: OBGYN CLINIC | Facility: CLINIC | Age: 33
End: 2023-06-30
Payer: COMMERCIAL

## 2023-06-30 VITALS
BODY MASS INDEX: 36.56 KG/M2 | HEART RATE: 86 BPM | HEIGHT: 61 IN | SYSTOLIC BLOOD PRESSURE: 118 MMHG | WEIGHT: 193.63 LBS | DIASTOLIC BLOOD PRESSURE: 80 MMHG

## 2023-06-30 PROCEDURE — 3008F BODY MASS INDEX DOCD: CPT | Performed by: NURSE PRACTITIONER

## 2023-06-30 PROCEDURE — 3079F DIAST BP 80-89 MM HG: CPT | Performed by: NURSE PRACTITIONER

## 2023-06-30 PROCEDURE — 3074F SYST BP LT 130 MM HG: CPT | Performed by: NURSE PRACTITIONER

## 2023-07-07 ENCOUNTER — TELEPHONE (OUTPATIENT)
Dept: OBGYN CLINIC | Facility: CLINIC | Age: 33
End: 2023-07-07

## 2023-07-07 ENCOUNTER — PATIENT MESSAGE (OUTPATIENT)
Dept: OBGYN CLINIC | Facility: CLINIC | Age: 33
End: 2023-07-07

## 2023-07-07 NOTE — TELEPHONE ENCOUNTER
Per Dr. Ari Balderrama: It is reasonable to treat the mother with nipple/breast pain for a candidal infection of the nipple/breast if other causes of pain are ruled out and the infant has definite oral candidiasis, even if there is no evidence of a nipple/areolar rash, or if the mother has evidence of a candidal infection on her nipple/breast. Initial treatment of women with nipple/breast pain only - Initial treatment consists of topical miconazole or clotrimazole applied to the nipple. Spoke to patient and discussed topical cream. Patient states's that the baby will also get antifungal oral medication. Discussed to make sure to wipe off before feedings and reapply after feedings. Patient to monitor and try changing breast pad inserts more often to make sure they are dry. All questions answered.

## 2023-07-07 NOTE — TELEPHONE ENCOUNTER
Pt called to see if she could get medicine or what she should do regarding (see my chart Message) she thinks her baby has thrush, and she does also on her breasts.  Would like to speak to a nurse

## 2023-07-19 ENCOUNTER — POSTPARTUM (OUTPATIENT)
Dept: OBGYN CLINIC | Facility: CLINIC | Age: 33
End: 2023-07-19
Payer: COMMERCIAL

## 2023-07-19 VITALS
SYSTOLIC BLOOD PRESSURE: 116 MMHG | DIASTOLIC BLOOD PRESSURE: 70 MMHG | BODY MASS INDEX: 36 KG/M2 | HEART RATE: 85 BPM | WEIGHT: 190.5 LBS

## 2023-07-19 DIAGNOSIS — Z98.891 S/P CESAREAN SECTION: ICD-10-CM

## 2023-07-19 PROCEDURE — 3074F SYST BP LT 130 MM HG: CPT | Performed by: OBSTETRICS & GYNECOLOGY

## 2023-07-19 PROCEDURE — 3078F DIAST BP <80 MM HG: CPT | Performed by: OBSTETRICS & GYNECOLOGY

## 2023-08-09 ENCOUNTER — OFFICE VISIT (OUTPATIENT)
Dept: FAMILY MEDICINE CLINIC | Facility: CLINIC | Age: 33
End: 2023-08-09
Payer: COMMERCIAL

## 2023-08-09 ENCOUNTER — LAB ENCOUNTER (OUTPATIENT)
Dept: LAB | Age: 33
End: 2023-08-09
Attending: NURSE PRACTITIONER
Payer: COMMERCIAL

## 2023-08-09 VITALS
RESPIRATION RATE: 16 BRPM | HEART RATE: 68 BPM | DIASTOLIC BLOOD PRESSURE: 72 MMHG | HEIGHT: 61.5 IN | BODY MASS INDEX: 35.41 KG/M2 | WEIGHT: 190 LBS | SYSTOLIC BLOOD PRESSURE: 116 MMHG | OXYGEN SATURATION: 98 % | TEMPERATURE: 97 F

## 2023-08-09 DIAGNOSIS — R79.89 ELEVATED LFTS: ICD-10-CM

## 2023-08-09 DIAGNOSIS — L71.9 ACNE ROSACEA: ICD-10-CM

## 2023-08-09 DIAGNOSIS — Z00.00 ENCOUNTER FOR ANNUAL HEALTH EXAMINATION: Primary | ICD-10-CM

## 2023-08-09 LAB
ALBUMIN SERPL-MCNC: 4.1 G/DL (ref 3.4–5)
ALBUMIN/GLOB SERPL: 1.1 {RATIO} (ref 1–2)
ALP LIVER SERPL-CCNC: 135 U/L
ALT SERPL-CCNC: 52 U/L
ANION GAP SERPL CALC-SCNC: 4 MMOL/L (ref 0–18)
AST SERPL-CCNC: 31 U/L (ref 15–37)
BILIRUB SERPL-MCNC: 0.4 MG/DL (ref 0.1–2)
BUN BLD-MCNC: 15 MG/DL (ref 7–18)
CALCIUM BLD-MCNC: 9.7 MG/DL (ref 8.5–10.1)
CHLORIDE SERPL-SCNC: 105 MMOL/L (ref 98–112)
CO2 SERPL-SCNC: 28 MMOL/L (ref 21–32)
CREAT BLD-MCNC: 0.84 MG/DL
EGFRCR SERPLBLD CKD-EPI 2021: 95 ML/MIN/1.73M2 (ref 60–?)
FASTING STATUS PATIENT QL REPORTED: NO
GLOBULIN PLAS-MCNC: 3.7 G/DL (ref 2.8–4.4)
GLUCOSE BLD-MCNC: 85 MG/DL (ref 70–99)
OSMOLALITY SERPL CALC.SUM OF ELEC: 284 MOSM/KG (ref 275–295)
POTASSIUM SERPL-SCNC: 4.4 MMOL/L (ref 3.5–5.1)
PROT SERPL-MCNC: 7.8 G/DL (ref 6.4–8.2)
SODIUM SERPL-SCNC: 137 MMOL/L (ref 136–145)

## 2023-08-09 PROCEDURE — 3078F DIAST BP <80 MM HG: CPT | Performed by: NURSE PRACTITIONER

## 2023-08-09 PROCEDURE — 80053 COMPREHEN METABOLIC PANEL: CPT

## 2023-08-09 PROCEDURE — 3074F SYST BP LT 130 MM HG: CPT | Performed by: NURSE PRACTITIONER

## 2023-08-09 PROCEDURE — 36415 COLL VENOUS BLD VENIPUNCTURE: CPT

## 2023-08-09 PROCEDURE — 99395 PREV VISIT EST AGE 18-39: CPT | Performed by: NURSE PRACTITIONER

## 2023-08-09 PROCEDURE — 3008F BODY MASS INDEX DOCD: CPT | Performed by: NURSE PRACTITIONER

## 2023-08-09 RX ORDER — AZELAIC ACID 0.15 G/G
1 GEL TOPICAL EVERY MORNING
Qty: 50 G | Refills: 2 | Status: SHIPPED | OUTPATIENT
Start: 2023-08-09

## 2023-08-09 NOTE — PROGRESS NOTES
Subjective:   Patient ID: Reid James is a 28year old female. HPI    Patient presents to the office today for her annual physical   Rosacea  requesting refill of metronidazole and Azelaic acid topical - states it has worked well for her and she would like to continue - discussed with patient that the computer shows that Azelaic acid should not be used in breastfeeding - patient states she checked with a pharmacist.  9 weeks post partum     History/Other:   Review of Systems   Constitutional: Negative. HENT: Negative. Eyes: Negative. Respiratory: Negative. Cardiovascular: Negative. Gastrointestinal: Negative. Endocrine: Negative. Genitourinary: Negative. Musculoskeletal: Negative. Skin:         See HPI    Allergic/Immunologic: Negative. Neurological: Negative. Hematological: Negative. Psychiatric/Behavioral: Negative. Current Outpatient Medications   Medication Sig Dispense Refill    metRONIDAZOLE 0.75 % External Cream Apply 1 Application topically nightly. 45 g 3    Azelaic Acid 15 % External Gel Apply 1 Application topically every morning. 50 g 2    Prenatal Vit-DSS-Fe Cbn-FA (PRENATAL AD OR) Take by mouth. Cholecalciferol (VITAMIN D-3 OR) Take by mouth. Fluticasone Propionate 50 MCG/ACT Nasal Suspension 1 spray by Each Nare route in the morning and 1 spray before bedtime. Allergies:  Seasonal                ITCHING    Objective:   Physical Exam  Vitals and nursing note reviewed. Constitutional:       General: She is not in acute distress. Appearance: Normal appearance. She is well-developed. HENT:      Head: Normocephalic and atraumatic. Right Ear: Hearing, tympanic membrane, ear canal and external ear normal.      Left Ear: Hearing, tympanic membrane, ear canal and external ear normal.      Nose: Nose normal.      Mouth/Throat:      Mouth: Mucous membranes are moist.      Dentition: Normal dentition.       Pharynx: Oropharynx is clear. No oropharyngeal exudate. Eyes:      General: Lids are normal.         Right eye: No discharge. Left eye: No discharge. Conjunctiva/sclera: Conjunctivae normal.      Pupils: Pupils are equal, round, and reactive to light. Neck:      Thyroid: No thyroid mass or thyromegaly. Trachea: Trachea normal. No tracheal deviation. Cardiovascular:      Rate and Rhythm: Normal rate and regular rhythm. Pulses: Normal pulses. Dorsalis pedis pulses are 2+ on the right side and 2+ on the left side. Posterior tibial pulses are 2+ on the right side and 2+ on the left side. Heart sounds: Normal heart sounds, S1 normal and S2 normal. No murmur heard. Pulmonary:      Effort: Pulmonary effort is normal.      Breath sounds: Normal breath sounds. Abdominal:      General: Bowel sounds are normal.      Palpations: Abdomen is soft. There is no mass. Tenderness: There is no abdominal tenderness. There is no guarding or rebound. Genitourinary:     Comments: No gyne exam done  Musculoskeletal:         General: Normal range of motion. Cervical back: Normal range of motion and neck supple. Comments: Moves all 4 extremities, normal strength   Lymphadenopathy:      Cervical: No cervical adenopathy. Upper Body:      Right upper body: No supraclavicular adenopathy. Left upper body: No supraclavicular adenopathy. Skin:     General: Skin is warm and dry. Nails: There is no clubbing. Neurological:      Mental Status: She is alert and oriented to person, place, and time. Psychiatric:         Mood and Affect: Mood normal.         Behavior: Behavior normal.         Thought Content:  Thought content normal.         Judgment: Judgment normal.         Assessment & Plan:   Encounter for annual health examination  (primary encounter diagnosis)  Elevated LFTs  Acne rosacea    Orders Placed This Encounter      Comp Metabolic Panel (14)      Meds This Visit:  Requested Prescriptions     Signed Prescriptions Disp Refills    metRONIDAZOLE 0.75 % External Cream 45 g 3     Sig: Apply 1 Application topically nightly. Azelaic Acid 15 % External Gel 50 g 2     Sig: Apply 1 Application topically every morning.        Imaging & Referrals:  None  Patient Instructions   Continue same medications-       Check blood work today - liver function     Follow up with gyne for pap     Fasting blood work next year

## 2023-08-09 NOTE — PATIENT INSTRUCTIONS
Continue same medications-       Check blood work today - liver function     Follow up with gyne for pap     Fasting blood work next year

## 2023-10-27 ENCOUNTER — TELEPHONE (OUTPATIENT)
Dept: INTERNAL MEDICINE CLINIC | Facility: HOSPITAL | Age: 33
End: 2023-10-27

## 2023-10-27 NOTE — TELEPHONE ENCOUNTER
[x] [facility]  []VANI   [] [address] [facility]  Manager : Caryshabana Kenyonchristalzay    HAVE YOU RECEIVED THE COVID-19 Vaccine? Yes [x]    No []          If yes, date(s) received: 12/19/20; 1/9/21; 11/11/21           Which vaccine:  Pfizer [x]     Waldemar Racer []    J&J []      SYMPTOMS (reported via dashboard):  [x] asymptomatic  [] symptomatic  [] GI symptoms only    Symptom onset date:   Fever   > 100F             Yes []      Cough                          Yes []      Shortness of breath  Yes []      Congestion                 Yes []      Runny nose                Yes []        Loss of Smell              Yes []        Loss of Taste             Yes []       Sore throat                 Yes []       Fatigue                        Yes []       Body Aches                Yes []        Chills                           Yes []        Headache                   Yes []             GI symptoms             Yes []     No []                     Nausea   []          Vomiting            []                                    Diarrhea  []          Upset stomach []      Employee has positive COVID Exposure? Yes [x]     No []    Date of exposure: 10/24  []  Coworker                       [x] patient                        [] Family/friend    Employee has a history of Covid?   Yes [x]     No []   If Yes, when: 8/15/22    When was the last shift you worked?: 10/24      PLAN:     COVID-19 testing ordered: [] Rapid    [] Alinity              Date test is to be taken:       []  Outside testing                           Notes: No true exposure, no testing required (she was masked and wasn't within 6ft of patient for greater than 15 minutes)    INSTRUCTIONS PROVIDED:    []  Employee was instructed to call Central scheduling at [phone] or use [facility] to make an appointment for their testing   []  May return to work if employee views negative result in [address] and remains fever, vomiting, and diarrhea free  [x]  May continue to work if remains asymptomatic   []  Follow up for condition update when resulting  []  If symptoms develop, stay home and call hotline for rapid test order  []  If COVID positive results, off work minimum of 5 days from positive test or onset of symptoms (day 0)     [x]  Plan noted above  []  Length of time to obtain results  []  Quarantine instructions  []  S/S of worsening infection/condition and importance of prompt medical re-evaluation including when to seek emergency care.    [x] The employee voiced understanding

## 2024-09-03 ENCOUNTER — TELEPHONE (OUTPATIENT)
Dept: OBGYN CLINIC | Facility: CLINIC | Age: 34
End: 2024-09-03

## 2024-09-03 DIAGNOSIS — N91.2 AMENORRHEA: Primary | ICD-10-CM

## 2024-09-03 NOTE — TELEPHONE ENCOUNTER
Patient calling to initiate prenatal care  LMP 7-8-24  Patient is 7-8 weeks on 9-22-24  Confirmation Ultrasound and Appointment scheduled on   Future Appointments   Date Time Provider Department Center   10/4/2024  8:45 AM [facility] US [facility] [address]   10/4/2024  9:45 AM Eric Barba MD [facility] [address]   10/16/2024 11:30 AM Conrado Apodaca MD [facility] [place]         Any history of ectopic pregnancy? no  Any history of miscarriage? no  Any medications that you are taking on a regular basis other than prenatal vitamins? no (if not taking prenatal vitamins, encourage patient to start taking.)  Any bleeding since the first day of last LMP and your positive pregnancy test? no    Insurance [employer]

## 2024-09-14 NOTE — TELEPHONE ENCOUNTER
Patient calling with an update. States she has tried cutting out dairy, gluten, and soy from her diet and has increased her water intake. Patient states she hasn't noticed any change in symptoms.   States she always just seems to have this upper abd disco You were seen in the emergency department today for unresponsive episode.  Claire was well-appearing with normal vital signs here.  Observe her at home, return to the emergency department or call 911 for new or recurrent episodes.  Contact your pediatrician tomorrow to notify them of your emergency department visit and obtain follow-up as soon as possible.

## 2024-10-03 PROBLEM — K76.0 FATTY LIVER: Status: ACTIVE | Noted: 2024-06-12

## 2024-10-03 PROBLEM — R60.0 BILATERAL LOWER EXTREMITY EDEMA: Status: RESOLVED | Noted: 2023-06-09 | Resolved: 2024-10-03

## 2024-10-03 PROBLEM — O28.0 ABNORMAL MSAFP (MATERNAL SERUM ALPHA-FETOPROTEIN), ELEVATED: Status: RESOLVED | Noted: 2023-01-11 | Resolved: 2024-10-03

## 2024-10-03 PROBLEM — Z34.00 SUPERVISION OF NORMAL FIRST PREGNANCY, ANTEPARTUM (HCC): Status: RESOLVED | Noted: 2023-05-08 | Resolved: 2024-10-03

## 2024-10-03 PROBLEM — R79.89 ELEVATED LFTS: Status: RESOLVED | Noted: 2023-06-07 | Resolved: 2024-10-03

## 2024-10-03 PROBLEM — Z51.81 THERAPEUTIC DRUG MONITORING: Status: RESOLVED | Noted: 2022-02-14 | Resolved: 2024-10-03

## 2024-10-03 PROBLEM — Z98.890 STATUS POST INDUCTION OF LABOR: Status: RESOLVED | Noted: 2023-06-07 | Resolved: 2024-10-03

## 2024-10-04 ENCOUNTER — ULTRASOUND ENCOUNTER (OUTPATIENT)
Dept: OBGYN CLINIC | Facility: CLINIC | Age: 34
End: 2024-10-04
Payer: COMMERCIAL

## 2024-10-04 ENCOUNTER — OFFICE VISIT (OUTPATIENT)
Dept: OBGYN CLINIC | Facility: CLINIC | Age: 34
End: 2024-10-04
Payer: COMMERCIAL

## 2024-10-04 ENCOUNTER — LAB ENCOUNTER (OUTPATIENT)
Dept: LAB | Age: 34
End: 2024-10-04
Attending: OBSTETRICS & GYNECOLOGY
Payer: COMMERCIAL

## 2024-10-04 VITALS
WEIGHT: 236.38 LBS | HEART RATE: 103 BPM | SYSTOLIC BLOOD PRESSURE: 126 MMHG | DIASTOLIC BLOOD PRESSURE: 84 MMHG | BODY MASS INDEX: 44.06 KG/M2 | HEIGHT: 61.5 IN

## 2024-10-04 DIAGNOSIS — N91.2 AMENORRHEA: Primary | ICD-10-CM

## 2024-10-04 DIAGNOSIS — N91.2 AMENORRHEA: ICD-10-CM

## 2024-10-04 LAB
B-HCG SERPL-ACNC: ABNORMAL MIU/ML
PROGEST SERPL-MCNC: 16.83 NG/ML

## 2024-10-04 PROCEDURE — 84144 ASSAY OF PROGESTERONE: CPT

## 2024-10-04 PROCEDURE — 76856 US EXAM PELVIC COMPLETE: CPT | Performed by: OBSTETRICS & GYNECOLOGY

## 2024-10-04 PROCEDURE — 99213 OFFICE O/P EST LOW 20 MIN: CPT | Performed by: OBSTETRICS & GYNECOLOGY

## 2024-10-04 PROCEDURE — 84702 CHORIONIC GONADOTROPIN TEST: CPT

## 2024-10-04 PROCEDURE — 36415 COLL VENOUS BLD VENIPUNCTURE: CPT

## 2024-10-04 NOTE — PROGRESS NOTES
Subjective:  Patient presents complaining of no menses. Positive home pregnancy test 8/28/24 and negative UCG 6-8 days prior.  LMP 7/28/24.  Menses every 3-4 wks.      Objective:  /84   Pulse 103   Ht 61.5\"   Wt 236 lb 6 oz (107.2 kg)   LMP 07/28/2024 (Exact Date)     Physical Examination:  General appearance: Well dressed, well nourished in no apparent distress  Neurologic/Psychiatric: Alert and oriented to person, place and time, mood normal, affect appropriate    Summary of Ultrasound Findings:  Ultrasound scan performed transabdominal and transvaginally.  Gest. Age based on LMP 7/28/24:    9w5day  NEAL 5/4/25  Small gestational sac measuring 2.66 cm consistent with 7w5 day pregnancy.  Yolk sac present, indicative of early intrauterine pregnancy.  No fetal pole noted.  Recommend repeat ultrasound 1 weeks for dating.  No free fluid and no adnexal masses.  Normal ovaries bilaterally  Impression: Early pregnancy, possible blighted ovum- Clinical correlation with HCG titers and repeat ultrasound 1 week recommended.     Assessment/Plan:  Amenorrhea- possible blighted ovum.  Check BHCG x 2 and progesterone.  B positive.  Repeat ultrasound 1 wk.  Recommend cytotec medical therapy if blighted ovum confirmed.      Diagnoses and all orders for this visit:    Amenorrhea  -     US PELVIS, ABDOMINAL GYNE EMG ONLY  -     HCG, Beta Subunit (Quant Pregnancy Test); Standing  -     Progesterone; Future    Other orders  -     OB/GYNE ULTRASOUND SCAN  -     OB/GYNE ULTRASOUND REPORT       Return in about 1 week (around 10/11/2024) for Ultrasound.

## 2024-10-11 ENCOUNTER — ULTRASOUND ENCOUNTER (OUTPATIENT)
Dept: OBGYN CLINIC | Facility: CLINIC | Age: 34
End: 2024-10-11
Payer: COMMERCIAL

## 2024-10-11 ENCOUNTER — OFFICE VISIT (OUTPATIENT)
Dept: OBGYN CLINIC | Facility: CLINIC | Age: 34
End: 2024-10-11
Payer: COMMERCIAL

## 2024-10-11 VITALS
BODY MASS INDEX: 44 KG/M2 | WEIGHT: 238.81 LBS | SYSTOLIC BLOOD PRESSURE: 122 MMHG | DIASTOLIC BLOOD PRESSURE: 80 MMHG | HEART RATE: 84 BPM

## 2024-10-11 DIAGNOSIS — O02.1 MISSED ABORTION (HCC): Primary | ICD-10-CM

## 2024-10-11 PROCEDURE — 99213 OFFICE O/P EST LOW 20 MIN: CPT | Performed by: OBSTETRICS & GYNECOLOGY

## 2024-10-11 PROCEDURE — 76856 US EXAM PELVIC COMPLETE: CPT | Performed by: OBSTETRICS & GYNECOLOGY

## 2024-10-11 NOTE — PROGRESS NOTES
Subjective:  Chief Complaint   Patient presents with    Other     DOVE     Follow up to possible missed .  No bleeding.  Pregnancy symptoms resolving.  Brief severe cramp but otherwise no pain    Objective:  /80   Pulse 84   Wt 238 lb 12.8 oz (108.3 kg)   LMP 2024 (Exact Date)   Physical Examination:  General appearance: Well dressed, well nourished in no apparent distress  Neurologic/Psychiatric: Alert and oriented to person, place and time, mood normal, affect appropriate    Summary of Ultrasound Findings:  Comparison to ultrasound dated 10/4/24  Gest. Age based on LMP 24:    [id number]  NEAL 25  Small gestational sac measuring 3.71 x 1.1 x 1.6 cm consistent with  pregnancy, increased in size compared to previous ultrasound.  Possible yolk sac present.  No fetal pole noted.    Impression: Likely missed .  Patient does not meet ultrasound criteria for definitive diagnosis.  Recommend repeat ultrasound Tuesday.    Assessment/Plan:  Likely missed - Discussed options for management, including expectant management, medical induction and suction D&C. Bleeding precautions given.  Repeat ultrasound Tuesday.    Patient desires trial of medical management if missed  confirmed on Tuesday ultrasound.      Risks, benefits and alternatives to medical management were discussed including but not limited to risk of heavy vaginal bleeding, severe abdominal pain, failed medical management, prolongation of miscarriage, retained products of conception and emergent services including surgical management with dilation and suction curettage.   Cytotec 800 mcg per vagina x 1 dose.  Motrin 600 mg PO q 6 hr PRN.  Oxycodone 5 for moderate pain.  ED and bleeding precautions provided.   Advised to follow up as needed or two to three weeks post miscarriage if doing well.  Advised to contact office if initial dose fails, to discuss if repeat dose indicated.    The patient is   Lab Results    Component Value Date    ABO B 2023    RH Positive 2023     Diagnoses and all orders for this visit:    Missed  (HCC)  -     US PELVIS, ABDOMINAL GYNE EMG ONLY; Future    Other orders  -     OB/GYNE ULTRASOUND SCAN  -     OB/GYNE ULTRASOUND REPORT      Return for Ultrasound Tuesday.      The patient has been re-examined and I agree with the above assessment or I updated with my findings.

## 2024-10-15 ENCOUNTER — ULTRASOUND ENCOUNTER (OUTPATIENT)
Dept: OBGYN CLINIC | Facility: CLINIC | Age: 34
End: 2024-10-15
Payer: COMMERCIAL

## 2024-10-15 DIAGNOSIS — O03.9 MISCARRIAGE (HCC): Primary | ICD-10-CM

## 2024-10-15 PROCEDURE — 76856 US EXAM PELVIC COMPLETE: CPT | Performed by: STUDENT IN AN ORGANIZED HEALTH CARE EDUCATION/TRAINING PROGRAM

## 2024-10-15 PROCEDURE — 76830 TRANSVAGINAL US NON-OB: CPT | Performed by: STUDENT IN AN ORGANIZED HEALTH CARE EDUCATION/TRAINING PROGRAM

## 2024-10-15 RX ORDER — MISOPROSTOL 200 UG/1
800 TABLET ORAL ONCE
Qty: 4 TABLET | Refills: 0 | Status: SHIPPED | OUTPATIENT
Start: 2024-10-15 | End: 2024-10-15

## 2024-10-15 RX ORDER — IBUPROFEN 600 MG/1
600 TABLET, FILM COATED ORAL EVERY 6 HOURS PRN
Qty: 30 TABLET | Refills: 0 | Status: SHIPPED | OUTPATIENT
Start: 2024-10-15

## 2024-10-15 RX ORDER — OXYCODONE HYDROCHLORIDE 5 MG/1
5 TABLET ORAL EVERY 4 HOURS PRN
Qty: 10 TABLET | Refills: 0 | Status: SHIPPED | OUTPATIENT
Start: 2024-10-15

## 2024-10-15 NOTE — PROGRESS NOTES
Missed AB  - US c/w with diagnosis of missed AB  - OB US reviewed and d/w patient    - d/w patient management options including observation versus medical versus surgical   - d/w patient risks, benefits and alternatives of each form of management including but not limited to risks of infection, bleeding, injury, management failure, emergent/urgent surgery relating to medical management and uterine adhesions relating to surgical management   - pt declined surgical management at this time  - pt reports she would like to try medical management   - risks, benefits and alternatives to medical management were discussed including but not limited to risk of heavy vaginal bleeding, severe abdominal pain, failed medical management, prolongation of SAB, RPOC and emergent services including surgical management with dilation and suction curettage   - pt agreeable for medical management  - Rx provided for Cytotec 800 mcg per vagina x 1 dose  - Rx provided for Motrin 600 mg PO q 6 hr PRN, advise to use for heavy bleeding and pain   - Rx provided for Roxicodone  - ED precautions provided   - advised to follow up in 1-2 week with repeat OB US   - repeat CBC today  - pt informed that medical therapy will be contraindicated if severely anemic   - advised to contact office if initial dose fails to discuss if repeat dose indicated     Conrado Apodaca MD

## 2024-10-16 ENCOUNTER — TELEPHONE (OUTPATIENT)
Dept: OBGYN CLINIC | Facility: CLINIC | Age: 34
End: 2024-10-16

## 2024-10-16 NOTE — TELEPHONE ENCOUNTER
Patient calling in and advised that she has experienced a miscarriage and was prescribed cytotec by Dr. Apodaca and wants to know the process for repeating the current dose.  Patient advised that she was previously seeing Dr. Perera regarding miscarriage.   Please advise the patient on how to proceed.  Thank you.

## 2024-10-16 NOTE — TELEPHONE ENCOUNTER
Called and spoke with pt.    Pt. says she took the Cytotec early this  morning and it has been about 8 hours now and no bleeding, cramping or passing of any tissue.  Pt. asking about repeat dosing of medication, Cytotec  if nothing happens with this dose.  Spoke with Dr. Apodaca and told her pt. asking about repeat dosing and she said if notihng happens after 12 hours,  repeat dosing can be done , Dr. Apodaca said for pt. to call office tomorrow morning and update us if another dose will be needed.  Dr. Apodaca also asked if pt. Had sone  CBC prior to her taking of the Cytotec and pt. did not do CBC.  .Pt. 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  Reviewed Dr. Apodaca's recommendations about repeating Cytotec tomorrow if nothing happens with this first dose and for pt. to call office in morning and update nurses.  Pt. Verbalizes understanding and agrees to plan.

## 2024-10-17 ENCOUNTER — TELEPHONE (OUTPATIENT)
Dept: OBGYN CLINIC | Facility: CLINIC | Age: 34
End: 2024-10-17

## 2024-10-17 DIAGNOSIS — O02.1 MISSED ABORTION (HCC): Primary | ICD-10-CM

## 2024-10-17 RX ORDER — MISOPROSTOL 200 UG/1
800 TABLET ORAL ONCE
Qty: 4 TABLET | Refills: 0 | Status: SHIPPED | OUTPATIENT
Start: 2024-10-17 | End: 2024-10-17

## 2024-10-17 NOTE — TELEPHONE ENCOUNTER
We may provide second dose to the patient.  New prescription provided.  Please notify patient.  Thank you.

## 2024-10-17 NOTE — TELEPHONE ENCOUNTER
Called and spoke with pt. And told her another rx of Cytotec was sent in for  her to take.  Pt. verbalizes understanding and agrees to plan.  ER precautions reviewed .and to call office tomorrow with any questions or concerns.  Pt. verbalizes understanding agrees to plan.

## 2024-10-17 NOTE — TELEPHONE ENCOUNTER
Called and spoke with pt.    Early yesterday morning, Wednesday, she took her Cytotec as order for her missed AB. As rx;ed per ..  She did not start to spot until about 6 pm last night pinkish- red and then finally about 11 pm to 1 am this morning she had ome heavier bleeding and passed a small clot the size of a strawberry and had some cramping..Only had to use a couple pads at that time and now is only having pink mucousy discharge when wiping...  Asking now about doing a repeat dosing. Of the Cytotec.  Refer to Dr. Apodaca's note under  ULTRASOUND ENCOUNTER 10/18/24.    Please advise.  Thanks

## 2024-10-17 NOTE — TELEPHONE ENCOUNTER
Patient had miscarriage was prescribed medication and is currently having some bleeding.Could like to discuss question with nurse. Please call cell phone.

## 2024-10-18 RX ORDER — MISOPROSTOL 200 UG/1
800 TABLET ORAL ONCE
Qty: 4 TABLET | Refills: 0 | Status: SHIPPED | OUTPATIENT
Start: 2024-10-18 | End: 2024-10-18

## 2024-10-18 NOTE — TELEPHONE ENCOUNTER
Please read thread of  messages /information and advise.    Received a call from pt now and she said she had taken 2nd repeat dosse of Cytotec last night about 6 pm, and nothing happened..  No bleeding, or cramping,,,,,nothing even when she wipes after going to washroom.    She is wondering what next step is?  Please advise.  Thanks

## 2024-10-18 NOTE — TELEPHONE ENCOUNTER
Called and spoke with pt. and reviewed recommendations from Dr. Bowles.  Reviewed about taking the Cytotec  medication and letting it dissolve in her check and do not swallow, better absorption this way.  ER precautions reviewed for bleeding/cramping/pelvic pain.  Pt. To call office on Monday with an update if she will need further appointment. to discuss a new plan.  Pt. verbalizes understanding of information and agrees to plan.

## 2024-11-01 ENCOUNTER — ULTRASOUND ENCOUNTER (OUTPATIENT)
Dept: OBGYN CLINIC | Facility: CLINIC | Age: 34
End: 2024-11-01
Payer: COMMERCIAL

## 2024-11-04 PROCEDURE — 76830 TRANSVAGINAL US NON-OB: CPT | Performed by: STUDENT IN AN ORGANIZED HEALTH CARE EDUCATION/TRAINING PROGRAM

## 2024-11-04 PROCEDURE — 76856 US EXAM PELVIC COMPLETE: CPT | Performed by: STUDENT IN AN ORGANIZED HEALTH CARE EDUCATION/TRAINING PROGRAM

## 2024-11-06 DIAGNOSIS — O03.9 MISCARRIAGE (HCC): Primary | ICD-10-CM

## 2025-01-25 ENCOUNTER — HOSPITAL ENCOUNTER (EMERGENCY)
Facility: HOSPITAL | Age: 35
Discharge: HOME | End: 2025-01-25
Attending: EMERGENCY MEDICINE
Payer: COMMERCIAL

## 2025-01-25 ENCOUNTER — APPOINTMENT (OUTPATIENT)
Dept: GENERAL RADIOLOGY | Facility: HOSPITAL | Age: 35
End: 2025-01-25
Payer: COMMERCIAL

## 2025-01-25 VITALS
RESPIRATION RATE: 20 BRPM | DIASTOLIC BLOOD PRESSURE: 79 MMHG | HEART RATE: 114 BPM | TEMPERATURE: 98 F | BODY MASS INDEX: 44.16 KG/M2 | OXYGEN SATURATION: 100 % | WEIGHT: 240 LBS | HEIGHT: 62 IN | SYSTOLIC BLOOD PRESSURE: 118 MMHG

## 2025-01-25 DIAGNOSIS — I47.19 ATRIAL TACHYCARDIA (HCC): Primary | ICD-10-CM

## 2025-01-25 LAB
ALBUMIN SERPL-MCNC: 4.7 G/DL (ref 3.2–4.8)
ALBUMIN/GLOB SERPL: 1.7 {RATIO} (ref 1–2)
ALP LIVER SERPL-CCNC: 128 U/L
ALT SERPL-CCNC: 105 U/L
ANION GAP SERPL CALC-SCNC: 9 MMOL/L (ref 0–18)
AST SERPL-CCNC: 57 U/L (ref ?–34)
BASOPHILS # BLD AUTO: 0.04 X10(3) UL (ref 0–0.2)
BASOPHILS NFR BLD AUTO: 0.6 %
BILIRUB SERPL-MCNC: 0.2 MG/DL (ref 0.3–1.2)
BUN BLD-MCNC: 5 MG/DL (ref 9–23)
CALCIUM BLD-MCNC: 9.9 MG/DL (ref 8.7–10.6)
CHLORIDE SERPL-SCNC: 105 MMOL/L (ref 98–112)
CO2 SERPL-SCNC: 26 MMOL/L (ref 21–32)
CREAT BLD-MCNC: 0.84 MG/DL
D DIMER PPP FEU-MCNC: <0.27 UG/ML FEU (ref ?–0.5)
EGFRCR SERPLBLD CKD-EPI 2021: 93 ML/MIN/1.73M2 (ref 60–?)
EOSINOPHIL # BLD AUTO: 0.15 X10(3) UL (ref 0–0.7)
EOSINOPHIL NFR BLD AUTO: 2.3 %
ERYTHROCYTE [DISTWIDTH] IN BLOOD BY AUTOMATED COUNT: 11.4 %
GLOBULIN PLAS-MCNC: 2.8 G/DL (ref 2–3.5)
GLUCOSE BLD-MCNC: 107 MG/DL (ref 70–99)
HCT VFR BLD AUTO: 45.8 %
HGB BLD-MCNC: 15.9 G/DL
IMM GRANULOCYTES # BLD AUTO: 0.02 X10(3) UL (ref 0–1)
IMM GRANULOCYTES NFR BLD: 0.3 %
LYMPHOCYTES # BLD AUTO: 2.26 X10(3) UL (ref 1–4)
LYMPHOCYTES NFR BLD AUTO: 35 %
MAGNESIUM SERPL-MCNC: 2 MG/DL (ref 1.6–2.6)
MCH RBC QN AUTO: 31.1 PG (ref 26–34)
MCHC RBC AUTO-ENTMCNC: 34.7 G/DL (ref 31–37)
MCV RBC AUTO: 89.6 FL
MONOCYTES # BLD AUTO: 0.5 X10(3) UL (ref 0.1–1)
MONOCYTES NFR BLD AUTO: 7.7 %
NEUTROPHILS # BLD AUTO: 3.49 X10 (3) UL (ref 1.5–7.7)
NEUTROPHILS # BLD AUTO: 3.49 X10(3) UL (ref 1.5–7.7)
NEUTROPHILS NFR BLD AUTO: 54.1 %
OSMOLALITY SERPL CALC.SUM OF ELEC: 288 MOSM/KG (ref 275–295)
PLATELET # BLD AUTO: 367 10(3)UL (ref 150–450)
POTASSIUM SERPL-SCNC: 4.3 MMOL/L (ref 3.5–5.1)
PROT SERPL-MCNC: 7.5 G/DL (ref 5.7–8.2)
RBC # BLD AUTO: 5.11 X10(6)UL
SODIUM SERPL-SCNC: 140 MMOL/L (ref 136–145)
TROPONIN I SERPL HS-MCNC: <3 NG/L
TROPONIN I SERPL HS-MCNC: <3 NG/L
TSI SER-ACNC: 3.67 UIU/ML (ref 0.55–4.78)
WBC # BLD AUTO: 6.5 X10(3) UL (ref 4–11)

## 2025-01-25 PROCEDURE — 83735 ASSAY OF MAGNESIUM: CPT | Performed by: EMERGENCY MEDICINE

## 2025-01-25 PROCEDURE — 85025 COMPLETE CBC W/AUTO DIFF WBC: CPT

## 2025-01-25 PROCEDURE — 84443 ASSAY THYROID STIM HORMONE: CPT | Performed by: EMERGENCY MEDICINE

## 2025-01-25 PROCEDURE — 84484 ASSAY OF TROPONIN QUANT: CPT | Performed by: EMERGENCY MEDICINE

## 2025-01-25 PROCEDURE — 71045 X-RAY EXAM CHEST 1 VIEW: CPT

## 2025-01-25 PROCEDURE — 99284 EMERGENCY DEPT VISIT MOD MDM: CPT

## 2025-01-25 PROCEDURE — 80053 COMPREHEN METABOLIC PANEL: CPT

## 2025-01-25 PROCEDURE — 85025 COMPLETE CBC W/AUTO DIFF WBC: CPT | Performed by: EMERGENCY MEDICINE

## 2025-01-25 PROCEDURE — 93005 ELECTROCARDIOGRAM TRACING: CPT

## 2025-01-25 PROCEDURE — 85379 FIBRIN DEGRADATION QUANT: CPT | Performed by: EMERGENCY MEDICINE

## 2025-01-25 PROCEDURE — 36415 COLL VENOUS BLD VENIPUNCTURE: CPT

## 2025-01-25 PROCEDURE — 99285 EMERGENCY DEPT VISIT HI MDM: CPT

## 2025-01-25 PROCEDURE — 84484 ASSAY OF TROPONIN QUANT: CPT

## 2025-01-25 PROCEDURE — 93010 ELECTROCARDIOGRAM REPORT: CPT

## 2025-01-25 PROCEDURE — 80053 COMPREHEN METABOLIC PANEL: CPT | Performed by: EMERGENCY MEDICINE

## 2025-01-25 RX ORDER — METOPROLOL TARTRATE 25 MG/1
25 TABLET, FILM COATED ORAL 2 TIMES DAILY PRN
Qty: 30 TABLET | Refills: 0 | Status: SHIPPED | OUTPATIENT
Start: 2025-01-25

## 2025-01-26 LAB
ATRIAL RATE: 122 BPM
P AXIS: 46 DEGREES
P-R INTERVAL: 146 MS
Q-T INTERVAL: 288 MS
QRS DURATION: 62 MS
QTC CALCULATION (BEZET): 410 MS
R AXIS: 82 DEGREES
T AXIS: 27 DEGREES
VENTRICULAR RATE: 122 BPM

## 2025-01-26 NOTE — ED PROVIDER NOTES
Patient Seen in: 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 presents to the emergency department complaining of palpitations and difficulty taking deep breaths since 6 PM tonight when she was driving into work.  She had a shorter episode yesterday.  No prior history of arrhythmia.  No prior history of VTE, hormone use, recent immobility, leg pain or swelling.  No lightheadedness or loss of consciousness.  Her resting heart rate is typically in the 90s.    Objective:     Past Medical History:    Binge eating    Obesity (BMI 30-39.9)    Ovarian cyst    Shifting sleep-work schedule, affecting sleep              Past Surgical History:   Procedure Laterality Date      2023    PLTCS at 39w2d on 23 for prolonged latent phase of labor after IOL for obesity (pre-pregnancy BMI 35.7 with excessive weight gain 32 lb). Presented for IOL. She received cervical ripening balloon, cytotec per protocol, pitocin per protocol and AROM performed. She did not make cervical change for over 14 hours from 3 cm. Patient offered and requested PCS.                Social History     Socioeconomic History    Marital status: Single   Tobacco Use    Smoking status: Never    Smokeless tobacco: Never   Vaping Use    Vaping status: Never Used   Substance and Sexual Activity    Alcohol use: Not Currently     Comment: wine with dinner 4-5 x week    Drug use: Never    Sexual activity: Yes     Partners: Male     Birth control/protection: Condom   Other Topics Concern    Caffeine Concern Yes     Comment: 2-3 a night of crystal light energy    Exercise Yes     Comment: walks her dogs    Seat Belt Yes    Special Diet No    Stress Concern Yes     Social Drivers of Health     Financial Resource Strain: Low Risk  (2023)    Financial Resource  Strain     Difficulty of Paying Living Expenses: Not hard at all     Med Affordability: No   Food Insecurity: No Food Insecurity (6/6/2023)    Food Insecurity     Food Insecurity: Never true   Transportation Needs: No Transportation Needs (6/6/2023)    Transportation Needs     Lack of Transportation: No   Stress: No Stress Concern Present (6/6/2023)    Stress     Feeling of Stress : No   Housing Stability: Low Risk  (6/6/2023)    Housing Stability     Housing Instability: No                  Physical Exam     ED Triage Vitals [01/25/25 1906]   BP (!) 142/93   Pulse (!) 129   Resp 18   Temp 98 °F (36.7 °C)   Temp src Oral   SpO2 99 %   O2 Device None (Room air)       Current Vitals:   Vital Signs  BP: 118/79  Pulse: 114  Resp: 20  Temp: 98 °F (36.7 °C)  Temp src: Oral  MAP (mmHg): 88    Oxygen Therapy  SpO2: 100 %  O2 Device: None (Room air)        Physical Exam     General Appearance: This is a young adult female sitting on a gurney.  Vital signs were reviewed per nurses notes.  Monitor reveals a sinus tachycardia rate of 1 20-1 30.  HEENT: Normocephalic/atraumatic.  Anicteric sclera.  Oral mucosa is moist.  Oropharynx is normal.  Neck: No adenopathy or thyromegaly.  Lungs are clear to auscultation.  Heart exam: Normal S1-S2 without extra sounds or murmurs.  Rapid rate, regular rhythm.  Abdomen is nontender.  Extremities: No clubbing claudication or edema.  No cords or calf tenderness.  Skin is dry without rashes or lesions.  Neuroexam: Awake, conversive and moving all 4 extremities well.  ED Course     Labs Reviewed   COMP METABOLIC PANEL (14) - Abnormal; Notable for the following components:       Result Value    Glucose 107 (*)     BUN 5 (*)     AST 57 (*)      (*)     Alkaline Phosphatase 128 (*)     Bilirubin, Total 0.2 (*)     All other components within normal limits   TROPONIN I HIGH SENSITIVITY - Normal   TSH W REFLEX TO FREE T4 - Normal   MAGNESIUM - Normal   TROPONIN I HIGH SENSITIVITY - Normal    D-DIMER - Normal   CBC WITH DIFFERENTIAL WITH PLATELET   RAINBOW DRAW LAVENDER   RAINBOW DRAW LIGHT GREEN   RAINBOW DRAW BLUE     EKG    Rate, intervals and axes as noted on EKG Report.  Rate: 122  Rhythm: Sinus tachycardia  Reading: Possible left atrial enlargement.  Borderline EKG.  Agree with EKG report.                Intravenous access was obtained.  Laboratory studies were drawn.    XR CHEST AP PORTABLE  (CPT=71045)    Result Date: 1/25/2025  PROCEDURE:  XR CHEST AP PORTABLE  (CPT=71045)  TECHNIQUE:  AP chest radiograph was obtained.  COMPARISON:  None.  INDICATIONS:  palaptions  PATIENT STATED HISTORY: (As transcribed by Technologist)  left sided chest pains.    FINDINGS:  Heart size is within normal limits.  Pleural spaces appear clear.  Mediastinal and hilar contours are normal.  No focal consolidation.  If clinical symptoms persist then recommend follow-up imaging.            CONCLUSION:  See above.   LOCATION:  [facility]      Dictated by (CST): Agapito Radford MD on 1/25/2025 at 9:04 PM     Finalized by (CST): Agapito Radford MD on 1/25/2025 at 9:05 PM       I personally reviewed the images myself and went over results with patient.    I viewed the chest x-ray films myself and there is no cardiopulmonary abnormality.    While I was talking to the patient her heart rate slowed down briefly into the 90s and then sped up again to 120.  Monitor strip was placed on her EMR.    Patient remained in a sinus tachycardia throughout the course the emergency department stay.  Baseline and 2-hour troponins were both negative and D-dimer was within normal range.    Test results and treatment plan were discussed prior to disposition.  Order for Holter monitor was dispensed as well as metoprolol to take as needed.  [facility] follow-up recommended.       MDM      #1.  Atrial tachycardia.  Etiology is uncertain.  Patient has no evidence of acute MI, arrhythmia or suspicion of pulmonary embolism.  Discharged to home with outpatient  follow-up and Holter monitor.        Medical Decision Making      Disposition and Plan     Clinical Impression:  1. Atrial tachycardia (HCC)         Disposition:  Discharge  1/25/2025 10:34 pm    Follow-up:  LISSETTE Mancia [facility] [address] [phone]  Call in 2 day(s)            Medications Prescribed:  Discharge Medication List as of 1/25/2025 10:41 PM        START taking these medications    Details   metoprolol tartrate 25 MG Oral Tab Take 1 tablet (25 mg total) by mouth 2 (two) times daily as needed., Normal, Disp-30 tablet, R-0                 Supplementary Documentation:

## 2025-01-26 NOTE — ED INITIAL ASSESSMENT (HPI)
Patient presents to the ER with chest pain at a 2/10. Pt describe the pain as ache. Patient denies double/blurred vision but has some SOB. Patient  does feel her heart beating fast.

## 2025-02-24 ENCOUNTER — LAB ENCOUNTER (OUTPATIENT)
Dept: LAB | Age: 35
End: 2025-02-24
Attending: INTERNAL MEDICINE
Payer: COMMERCIAL

## 2025-02-24 DIAGNOSIS — R79.89 LFT ELEVATION: Primary | ICD-10-CM

## 2025-02-24 LAB
ALBUMIN SERPL-MCNC: 4.9 G/DL (ref 3.2–4.8)
ALBUMIN/GLOB SERPL: 1.8 {RATIO} (ref 1–2)
ALP LIVER SERPL-CCNC: 104 U/L
ALT SERPL-CCNC: 56 U/L
ANION GAP SERPL CALC-SCNC: 9 MMOL/L (ref 0–18)
AST SERPL-CCNC: 37 U/L (ref ?–34)
BILIRUB SERPL-MCNC: 0.3 MG/DL (ref 0.3–1.2)
BUN BLD-MCNC: 13 MG/DL (ref 9–23)
CALCIUM BLD-MCNC: 9.7 MG/DL (ref 8.7–10.6)
CHLORIDE SERPL-SCNC: 100 MMOL/L (ref 98–112)
CO2 SERPL-SCNC: 29 MMOL/L (ref 21–32)
CREAT BLD-MCNC: 0.85 MG/DL
EGFRCR SERPLBLD CKD-EPI 2021: 92 ML/MIN/1.73M2 (ref 60–?)
FASTING STATUS PATIENT QL REPORTED: YES
GLOBULIN PLAS-MCNC: 2.8 G/DL (ref 2–3.5)
GLUCOSE BLD-MCNC: 94 MG/DL (ref 70–99)
OSMOLALITY SERPL CALC.SUM OF ELEC: 286 MOSM/KG (ref 275–295)
POTASSIUM SERPL-SCNC: 4.3 MMOL/L (ref 3.5–5.1)
PROT SERPL-MCNC: 7.7 G/DL (ref 5.7–8.2)
SODIUM SERPL-SCNC: 138 MMOL/L (ref 136–145)

## 2025-02-24 PROCEDURE — 36415 COLL VENOUS BLD VENIPUNCTURE: CPT

## 2025-02-24 PROCEDURE — 80053 COMPREHEN METABOLIC PANEL: CPT

## 2025-03-02 ENCOUNTER — PATIENT MESSAGE (OUTPATIENT)
Dept: OBGYN CLINIC | Facility: CLINIC | Age: 35
End: 2025-03-02

## 2025-03-02 DIAGNOSIS — R30.0 DYSURIA: Primary | ICD-10-CM

## 2025-03-02 RX ORDER — CEPHALEXIN 500 MG/1
500 CAPSULE ORAL 4 TIMES DAILY
Qty: 20 CAPSULE | Refills: 0 | Status: SHIPPED | OUTPATIENT
Start: 2025-03-02 | End: 2025-03-07

## 2025-03-12 ENCOUNTER — TELEPHONE (OUTPATIENT)
Dept: OBGYN CLINIC | Facility: CLINIC | Age: 35
End: 2025-03-12

## 2025-03-12 DIAGNOSIS — N91.2 AMENORRHEA: Primary | ICD-10-CM

## 2025-03-12 NOTE — TELEPHONE ENCOUNTER
Patient calling to initiate prenatal care  LMP 02/11  Patient is 7-8 weeks on 04/08  Confirmation of pregnancy appointment scheduled on   Future Appointments   Date Time Provider Department Center   4/8/2025 11:15 AM [facility]   4/8/2025 12:00 PM Anastasia Gates DO [facility] [place]   5/7/2025 11:00 AM Eric Barba MD [facility]   6/4/2025  2:15 PM Renetta Christian MD [facility]       [facility]    Any history of ectopic pregnancy? NO  Any history of miscarriage? Yes, 1 in October   Any medications that you are taking on a regular basis other than prenatal vitamins? NO (if not taking prenatal vitamins, encourage patient to start taking.)  Any bleeding since the first day of last LMP and your positive pregnancy test? NO

## 2025-03-19 ENCOUNTER — TELEPHONE (OUTPATIENT)
Dept: OBGYN CLINIC | Facility: CLINIC | Age: 35
End: 2025-03-19

## 2025-03-19 DIAGNOSIS — O20.9 BLEEDING IN EARLY PREGNANCY (HCC): Primary | ICD-10-CM

## 2025-03-19 NOTE — TELEPHONE ENCOUNTER
5w1d based on LMP 2/11/2025 heavy period like bleeding began last night. ER precautions provided.   Patient notes 2 positive pregnancy tests 3/18/25.  This would be her second miscarriage. Patient asks if they would need to wait a cycle or to to start trying again. Please advise of any orders.

## 2025-03-19 NOTE — TELEPHONE ENCOUNTER
I recommend serial HCG x 2. If she can go today that would be great than I can review tomorrow. If she doesn't go today than I will be out of the office. Can yo watch for the results to ensure a provider reviews it at that point?

## 2025-03-19 NOTE — TELEPHONE ENCOUNTER
Pt started bleeding last night, it is not very heavy , it is like period, wants to speak with nurse as she thinks she is miscarrying. She was scheduled for  exam and ultrasound.  Future Appointments   Date Time Provider Department Center   4/8/2025 11:15 AM [facility]   4/8/2025 12:00 PM Anastasia Gates DO [facility]   5/7/2025 11:00 AM Eric Barba MD [facility]   6/4/2025  2:15 PM Renetta Christian MD [facility]

## 2025-03-19 NOTE — TELEPHONE ENCOUNTER
Voice mail left requesting call back to instruct on serial Hcg testing to be done q48h.   Sent via [employer] message as well.

## 2025-03-19 NOTE — TELEPHONE ENCOUNTER
Returned call to patient, She notes works night at  and unable to get to the lab until Friday as she lives an hour away and lab hours do not coincide with her schedule. Will obtain labs ASAP. Patient expresses understanding stating she knows we are looking for a downward trend. She has had a consistent heavy period like flow.

## 2025-03-20 ENCOUNTER — LAB ENCOUNTER (OUTPATIENT)
Dept: LAB | Age: 35
End: 2025-03-20
Attending: NURSE PRACTITIONER
Payer: COMMERCIAL

## 2025-03-20 DIAGNOSIS — O20.9 BLEEDING IN EARLY PREGNANCY (HCC): ICD-10-CM

## 2025-03-20 LAB — B-HCG SERPL-ACNC: 6.9 MIU/ML

## 2025-03-20 PROCEDURE — 84702 CHORIONIC GONADOTROPIN TEST: CPT

## 2025-03-20 PROCEDURE — 36415 COLL VENOUS BLD VENIPUNCTURE: CPT

## 2025-03-21 NOTE — TELEPHONE ENCOUNTER
LINNEA Landry requested Hcg result be reviewed as she is not in office.   5w3d  Component  Ref Range & Units 3/20/25 10:31 AM   Hcg Quantitative  <=4.2 mIU/mL 6.9 High       Patient to repeat Hcg 3/22. Please advise.

## 2025-03-24 ENCOUNTER — LAB ENCOUNTER (OUTPATIENT)
Dept: LAB | Age: 35
End: 2025-03-24
Attending: NURSE PRACTITIONER
Payer: COMMERCIAL

## 2025-03-24 DIAGNOSIS — O20.9 BLEEDING IN EARLY PREGNANCY (HCC): ICD-10-CM

## 2025-03-24 LAB — B-HCG SERPL-ACNC: <2.6 MIU/ML

## 2025-03-24 PROCEDURE — 84702 CHORIONIC GONADOTROPIN TEST: CPT

## 2025-03-24 PROCEDURE — 36415 COLL VENOUS BLD VENIPUNCTURE: CPT

## 2025-03-24 NOTE — TELEPHONE ENCOUNTER
Likely chemical pregnancy, low now negative HCG. Await 1 normal menses to attempt conception. Schedule annual and we can answer any questions she may have.

## 2025-03-24 NOTE — TELEPHONE ENCOUNTER
Spoke with patient regarding results and recommendations as noted below. Explained that a chemical pregnancy (or biochemical pregnancy) is a very early miscarriage that happens within the first five weeks. An embryo forms and implants in your uterine lining, but then it stops developing.  Patient confirmed understanding. Patient works nights and was asleep at the time of my call, patient aware of need to schedule her annual WWE.

## 2025-03-28 ENCOUNTER — HOSPITAL ENCOUNTER (OUTPATIENT)
Age: 35
Discharge: HOME | End: 2025-03-28
Payer: COMMERCIAL

## 2025-03-28 VITALS
TEMPERATURE: 98 F | OXYGEN SATURATION: 97 % | DIASTOLIC BLOOD PRESSURE: 80 MMHG | HEART RATE: 100 BPM | SYSTOLIC BLOOD PRESSURE: 130 MMHG | RESPIRATION RATE: 18 BRPM

## 2025-03-28 DIAGNOSIS — L28.2 PRURITIC RASH: ICD-10-CM
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
## 2025-03-28 PROCEDURE — 99213 OFFICE O/P EST LOW 20 MIN: CPT | Performed by: NURSE PRACTITIONER

## 2025-03-28 RX ORDER — PERMETHRIN 50 MG/G
1 CREAM TOPICAL ONCE
Qty: 60 G | Refills: 2 | Status: SHIPPED | OUTPATIENT
Start: 2025-03-28 | End: 2025-03-28

## 2025-03-28 NOTE — DISCHARGE INSTRUCTIONS
Supportive care measures:   - Apply Permethrin from scalp to toes and let sit on skin for ~12 hours. Rinse off completely.   - Clean mattress, linens, fabrics on hot and dry on hot (you can steam mattress and pillows)   - Take Zyrtec or Xyzal daily for the next 7-days   - Take Pepcid 20mg 1-2x daily for the next 7-days   - DO NOT scratch as this can lead to infection   - Drink plenty of water   - Monitor for spreading of redness from site as this may mean infection and may require re-evaluation   - Follow up with Dermatology if symptoms do not improve

## 2025-03-28 NOTE — ED PROVIDER NOTES
History     Chief Complaint   Patient presents with    Rash Skin Problem       Subjective:   HPI    Brenda Lynn Bonnevier, 34 year old female with notable medical history of fatty liver who presents with pruritic rash. Patient reports she and her family have been dealing with a pruritic rash for approx 1-month. Patient brought her child to the pediatrician and was told it was folliculitis, however, symptoms have persisted despite treatment with topical steroid. They have cleaned their linens and mattress for concern about bed bugs or scabies. 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 Family does own dogs.      Patient Active Problem List   Diagnosis    BMI 40.0-44.9, adult (HCC)    Binge eating    Shifting sleep-work schedule, affecting sleep    Antibody response exam    Previous  section    Fatty liver      Objective:   Past Medical History:    Binge eating    Obesity (BMI 30-39.9)    Ovarian cyst    Shifting sleep-work schedule, affecting sleep              Past Surgical History:   Procedure Laterality Date      2023    PLTCS at 39w2d on 23 for prolonged latent phase of labor after IOL for obesity (pre-pregnancy BMI 35.7 with excessive weight gain 32 lb). Presented for IOL. She received cervical ripening balloon, cytotec per protocol, pitocin per protocol and AROM performed. She did not make cervical change for over 14 hours from 3 cm. Patient offered and requested PCS.                Social History     Socioeconomic History    Marital status: Single   Tobacco Use    Smoking status: Never    Smokeless tobacco: Never   Vaping Use    Vaping status: Never Used   Substance and Sexual Activity    Alcohol use: Not Currently     Comment: wine with dinner 4-5 x week    Drug use: Never    Sexual activity: Yes     Partners: Male     Birth control/protection: Condom   Other Topics Concern    Caffeine Concern Yes     Comment: 2-3 a night of crystal light  energy    Exercise Yes     Comment: walks her dogs    Seat Belt Yes    Special Diet No    Stress Concern Yes     Social Drivers of Health     Food Insecurity: No Food Insecurity (6/6/2023)    Food Insecurity     Food Insecurity: Never true   Transportation Needs: No Transportation Needs (6/6/2023)    Transportation Needs     Lack of Transportation: No   Housing Stability: Low Risk  (6/6/2023)    Housing Stability     Housing Instability: No              Medications Ordered Prior to Encounter[1]      Constitutional and vital signs reviewed.      All other systems reviewed and negative except as noted above.    I have reviewed the family history, social history, allergies, and outpatient medications.     History reviewed from EMR: Encounters, problem list, allergies, medications      Physical Exam     ED Triage Vitals [03/28/25 0946]   /80   Pulse 100   Resp 18   Temp 98.2 °F (36.8 °C)   Temp src Oral   SpO2 97 %   O2 Device None (Room air)       Current:/80   Pulse 100   Temp 98.2 °F (36.8 °C) (Oral)   Resp 18   LMP 03/18/2025 (Exact Date)   SpO2 97%       Physical Exam  Vitals and nursing note reviewed.   Constitutional:       General: She is not in acute distress.     Appearance: Normal appearance. She is normal weight. She is not ill-appearing or toxic-appearing.   HENT:      Head: Normocephalic and atraumatic.      Right Ear: External ear normal.      Left Ear: External ear normal.      Nose: Nose normal.      Mouth/Throat:      Mouth: Mucous membranes are moist.   Eyes:      Extraocular Movements: Extraocular movements intact.      Conjunctiva/sclera: Conjunctivae normal.      Pupils: Pupils are equal, round, and reactive to light.   Cardiovascular:      Rate and Rhythm: Normal rate.      Pulses: Normal pulses.   Pulmonary:      Effort: Pulmonary effort is normal. No respiratory distress.   Musculoskeletal:         General: No swelling, tenderness or signs of injury. Normal range of motion.       Cervical back: Normal range of motion and neck supple.   Skin:     General: Skin is warm and dry.      Capillary Refill: Capillary refill takes less than 2 seconds.      Coloration: Skin is not jaundiced.      Findings: Rash present. Rash is papular.      Comments: Diffusely spread papular rash (See media)   Neurological:      General: No focal deficit present.      Mental Status: She is alert and oriented to person, place, and time. Mental status is at baseline.   Psychiatric:         Mood and Affect: Mood normal.         Behavior: Behavior normal.         Thought Content: Thought content normal.         Judgment: Judgment normal.       Right lower back       Right forearm        ED Course     Labs Reviewed - No data to display  No orders to display       Vitals:    03/28/25 0946   BP: 130/80   Pulse: 100   Resp: 18   Temp: 98.2 °F (36.8 °C)   TempSrc: Oral   SpO2: 97%            [facility]        Taylor Lynn Bonnevier, 34 year old female with medical history as noted above who presents with pruritic rash   - Patient in NAD, VSS   - arthropod bites (body lice, fleas, scabies, other) vs folliculitis vs dermatitis vs other   - Rash not c/w infections etiology   - Suspect arthropod given appearance and duration of symptoms   - Rx Permethrin   - Supportive care and environmental hygiene discussed   - Dermatology contact provided       ** See ED course below for additional information on care provided / interventions / notable events throughout patient's encounter.    ** See Home Care Instructions below for care measures to trial as applicable.         ** I have independently reviewed the radiology images, clinical lab results, and ECG tracings as described above (if applicable)    ** Concerning co-morbidities possibly affecting complaint / care: n/a    ** See disposition & plan section below for home care instructions - if applicable        Medical Decision Making  Risk  OTC drugs.  Prescription drug  management.        Disposition and Plan     Disposition:  Discharge  3/28/2025 10:24 am    Clinical Impression:  1. Arthropod bite, initial encounter    2. Pruritic rash            Home care instructions:    Supportive care measures:   - Apply Permethrin from scalp to toes and let sit on skin for ~12 hours. Rinse off completely.   - Clean mattress, linens, fabrics on hot and dry on hot (you can steam mattress and pillows)   - Take Zyrtec or Xyzal daily for the next 7-days   - Take Pepcid 20mg 1-2x daily for the next 7-days   - DO NOT scratch as this can lead to infection   - Drink plenty of water   - Monitor for spreading of redness from site as this may mean infection and may require re-evaluation   - Follow up with Dermatology if symptoms do not improve      Follow-up:  [facility] [address]  [facility] [id number] [phone]    Dermatology contact          Medications Prescribed:  Current Discharge Medication List        START taking these medications    Details   permethrin 5 % External Cream Apply 1 Application topically once for 1 dose.  Qty: 60 g, Refills: 2               Shan Adams, DNP, APRN, AGACNP-BC, FNP-C, CNL  Adult-Gerontology Acute Care & Family Nurse Practitioner  [facility]      The above patient (and/or guardian) was made aware that an appropriate evaluation has been performed, and that no additional testing is required at this time. In my medical judgment, there is currently no evidence of an immediate life-threatening or surgical condition, therefore discharge is indicated at this time. The patient (and/or guardian) was advised that a small risk still exists that a serious condition could develop. The patient was instructed to arrange close follow-up with their primary care provider (or the referral provider given today). The patient received written and verbal instructions regarding their condition / concerns, demonstrated  understanding, and is agreement with the outpatient treatment plan.              [1]   No current facility-administered medications on file prior to encounter.     Current Outpatient Medications on File Prior to Encounter   Medication Sig Dispense Refill    Prenatal Vit-DSS-Fe Cbn-FA (PRENATAL AD OR) Take by mouth.      [] cephALEXin 500 MG Oral Cap Take 1 capsule (500 mg total) by mouth 4 (four) times daily for 5 days. 20 capsule 0    metoprolol tartrate 25 MG Oral Tab Take 1 tablet (25 mg total) by mouth 2 (two) times daily as needed. 30 tablet 0    oxyCODONE 5 MG Oral Tab Take 1 tablet (5 mg total) by mouth every 4 (four) hours as needed for Pain. 10 tablet 0    ibuprofen 600 MG Oral Tab Take 1 tablet (600 mg total) by mouth every 6 (six) hours as needed for Pain. 30 tablet 0    Cholecalciferol (VITAMIN D-3 OR) Take by mouth.

## 2025-03-30 ENCOUNTER — HOSPITAL ENCOUNTER (OUTPATIENT)
Age: 35
Discharge: HOME | End: 2025-03-30
Payer: COMMERCIAL

## 2025-03-30 VITALS
SYSTOLIC BLOOD PRESSURE: 138 MMHG | RESPIRATION RATE: 20 BRPM | OXYGEN SATURATION: 98 % | HEIGHT: 61 IN | HEART RATE: 84 BPM | DIASTOLIC BLOOD PRESSURE: 98 MMHG | TEMPERATURE: 99 F | WEIGHT: 235 LBS | BODY MASS INDEX: 44.37 KG/M2

## 2025-03-30 DIAGNOSIS — N89.8 VAGINAL IRRITATION: Primary | ICD-10-CM

## 2025-03-30 PROCEDURE — 99213 OFFICE O/P EST LOW 20 MIN: CPT | Performed by: PHYSICIAN ASSISTANT

## 2025-03-30 RX ORDER — DIAPER,BRIEF,INFANT-TODD,DISP
1 EACH MISCELLANEOUS 2 TIMES DAILY
Qty: 15 G | Refills: 0 | Status: SHIPPED | OUTPATIENT
Start: 2025-03-30 | End: 2025-04-06

## 2025-03-30 RX ORDER — FLUCONAZOLE 150 MG/1
150 TABLET ORAL ONCE
Qty: 1 TABLET | Refills: 0 | Status: SHIPPED | OUTPATIENT
Start: 2025-03-30 | End: 2025-03-30

## 2025-03-30 NOTE — ED PROVIDER NOTES
Patient Seen in: [facility]      History     Chief Complaint   Patient presents with    Eval-G     Stated Complaint: Rash    Subjective:   HPI    33 yo female here for evaluation of vaginal irritation.  Pt states the symptoms started in the last 1-2 days.  She was recently treated for body lice (used Elimite).  She denies any vaginal discharge, bleeding or pain.  Pt has no concern for STI. She is concerned that she may have gotten elimite internally    Objective:     Past Medical History:    Binge eating    Obesity (BMI 30-39.9)    Ovarian cyst    Shifting sleep-work schedule, affecting sleep              Past Surgical History:   Procedure Laterality Date      2023    PLTCS at 39w2d on 23 for prolonged latent phase of labor after IOL for obesity (pre-pregnancy BMI 35.7 with excessive weight gain 32 lb). Presented for IOL. She received cervical ripening balloon, cytotec per protocol, pitocin per protocol and AROM performed. She did not make cervical change for over 14 hours from 3 cm. Patient offered and requested PCS.                Social History     Socioeconomic History    Marital status: Single   Tobacco Use    Smoking status: Never    Smokeless tobacco: Never   Vaping Use    Vaping status: Never Used   Substance and Sexual Activity    Alcohol use: Not Currently     Comment: wine with dinner 4-5 x week    Drug use: Never    Sexual activity: Yes     Partners: Male     Birth control/protection: Condom   Other Topics Concern    Caffeine Concern Yes     Comment: 2-3 a night of crystal light energy    Exercise Yes     Comment: walks her dogs    Seat Belt Yes    Special Diet No    Stress Concern Yes     Social Drivers of Health     Food Insecurity: No Food Insecurity (2023)    Food Insecurity     Food Insecurity: Never true   Transportation Needs: No Transportation Needs (2023)    Transportation Needs     Lack of Transportation: No   Housing Stability: Low Risk   (6/6/2023)    Housing Stability     Housing Instability: No              Review of Systems    Positive for stated complaint: Rash  Other systems are as noted in HPI.  Constitutional and vital signs reviewed.      All other systems reviewed and negative except as noted above.    Physical Exam     ED Triage Vitals [03/30/25 0830]   BP (!) 138/98   Pulse 84   Resp 20   Temp 99 °F (37.2 °C)   Temp src Oral   SpO2 98 %   O2 Device None (Room air)       Current Vitals:   Vital Signs  BP: (!) 138/98  Pulse: 84  Resp: 20  Temp: 99 °F (37.2 °C)  Temp src: Oral    Oxygen Therapy  SpO2: 98 %  O2 Device: None (Room air)        Physical Exam  Vitals and nursing note reviewed.   Constitutional:       General: She is not in acute distress.  HENT:      Head: Normocephalic and atraumatic.      Right Ear: External ear normal.      Left Ear: External ear normal.      Nose: Nose normal.      Mouth/Throat:      Mouth: Mucous membranes are moist.   Eyes:      Extraocular Movements: Extraocular movements intact.      Pupils: Pupils are equal, round, and reactive to light.   Cardiovascular:      Rate and Rhythm: Normal rate.   Pulmonary:      Effort: Pulmonary effort is normal.   Abdominal:      General: Abdomen is flat.   Genitourinary:     Vagina: No vaginal discharge.      Comments: No significant erythema, edema, lesions.  No appreciable drainage   Musculoskeletal:         General: Normal range of motion.      Cervical back: Normal range of motion.   Skin:     General: Skin is warm.   Neurological:      General: No focal deficit present.      Mental Status: She is alert and oriented to person, place, and time.   Psychiatric:         Mood and Affect: Mood normal.         Behavior: Behavior normal.             ED Course   Labs Reviewed - No data to display     34-year-old female presenting to the immediate care for evaluation of vaginal irritation.  On physical exam I do not appreciate any erythema, edema, rashes to the vaginal area.  No  appreciable discharge.  Patient has no urinary symptoms.   Ddx-irritant dermatitis secondary to permethrin use, yeast vaginitis, pubic lice    Patient just use permethrin 2 days ago, I advised against repeat use especially as some of the patient's symptoms have improved.  Just using a low potency topical steroid to help assist with irritation I have cautioned against using Monistat or any other vaginal product.    Diflucan Rx'd for possible yeast.  Wait-and-see approach discussed           MDM              Medical Decision Making      Disposition and Plan     Clinical Impression:  1. Vaginal irritation         Disposition:  Discharge  3/30/2025  9:47 am    Follow-up:  No follow-up provider specified.        Medications Prescribed:  Discharge Medication List as of 3/30/2025  9:49 AM        START taking these medications    Details   hydrocortisone 0.5 % External Cream Apply 1 Application topically 2 (two) times daily for 7 days., Normal, Disp-15 g, R-0      fluconazole 150 MG Oral Tab Take 1 tablet (150 mg total) by mouth once for 1 dose., Normal, Disp-1 tablet, R-0                 Supplementary Documentation:

## 2025-03-30 NOTE — ED INITIAL ASSESSMENT (HPI)
Pt was seen here on Friday and prescribed cream.  Pt states rash has gotten better.  Pt states put cream along vaginal area.  Pt now c/o vaginal discomfort

## 2025-05-06 PROBLEM — R79.89 LFT ELEVATION: Status: RESOLVED | Noted: 2023-06-07 | Resolved: 2025-05-06

## 2025-05-06 PROBLEM — I25.10 ARTERIOSCLEROSIS OF CORONARY ARTERY: Status: ACTIVE | Noted: 2025-02-07

## 2025-05-06 PROBLEM — I47.19 ATRIAL TACHYCARDIA (HCC): Status: ACTIVE | Noted: 2025-02-04

## 2025-05-06 PROBLEM — R79.89 LFT ELEVATION: Status: ACTIVE | Noted: 2023-06-07

## 2025-05-07 ENCOUNTER — OFFICE VISIT (OUTPATIENT)
Dept: OBGYN CLINIC | Facility: CLINIC | Age: 35
End: 2025-05-07
Payer: COMMERCIAL

## 2025-05-07 VITALS
SYSTOLIC BLOOD PRESSURE: 126 MMHG | HEART RATE: 94 BPM | BODY MASS INDEX: 44.73 KG/M2 | WEIGHT: 240 LBS | DIASTOLIC BLOOD PRESSURE: 74 MMHG | HEIGHT: 61.5 IN

## 2025-05-07 DIAGNOSIS — Z12.4 SCREENING FOR CERVICAL CANCER: ICD-10-CM

## 2025-05-07 DIAGNOSIS — Z01.419 ENCOUNTER FOR GYNECOLOGICAL EXAMINATION WITHOUT ABNORMAL FINDING: Primary | ICD-10-CM

## 2025-05-07 PROCEDURE — 87624 HPV HI-RISK TYP POOLED RSLT: CPT | Performed by: OBSTETRICS & GYNECOLOGY

## 2025-05-07 PROCEDURE — 88175 CYTOPATH C/V AUTO FLUID REDO: CPT | Performed by: OBSTETRICS & GYNECOLOGY

## 2025-05-07 PROCEDURE — 99395 PREV VISIT EST AGE 18-39: CPT | Performed by: OBSTETRICS & GYNECOLOGY

## 2025-05-07 RX ORDER — TRIAMCINOLONE ACETONIDE 1 MG/G
1 CREAM TOPICAL
COMMUNITY
Start: 2025-04-03

## 2025-05-07 RX ORDER — PERMETHRIN 50 MG/G
CREAM TOPICAL
COMMUNITY
Start: 2025-04-03

## 2025-05-07 NOTE — PROGRESS NOTES
Subjective:  Chief Complaint   Patient presents with    Annual     34 year old female who presents for annual well woman visit without complaints.  Hx of miscarriage x 2, most recently chemical Ab 3/2025.  Doesn't have difficulty getting pregnant and menses are monthly.    Patient's last menstrual period was 04/21/2025 (exact date).  Hx Prior Abnormal Pap: No  Pap Date: 01/22/21  Pap Result Notes: WNL  Menarche: 9 (5/7/2025 10:57 AM)  Period Cycle (Days): 28-30 days (5/7/2025 10:57 AM)  Period Duration (Days): 5-6 days (5/7/2025 10:57 AM)  Period Flow: Heavy to moderate (5/7/2025 10:57 AM)  Use of Birth Control (if yes, specify type): Condoms (5/7/2025 10:57 AM)  Hx Prior Abnormal Pap: No (5/7/2025 10:57 AM)  Pap Date: 01/22/21 (5/7/2025 10:57 AM)  Pap Result Notes: WNL (5/7/2025 10:57 AM)      Last Pap smear: 2021     Abnormal Pap: n    Pelvic Infections/STD: None  Contraception: none    Review of Systems:  Pertinent items are noted in the HPI.    Patient History:  New Medical Illness: None  New Surgeries: None  New Family History: None   reports that she has never smoked. She has never used smokeless tobacco.   reports that she does not currently use alcohol.    Most Recent Immunizations   Administered Date(s) Administered    Covid-19 Vaccine Pfizer 30 mcg/0.3 ml 11/11/2021    FLULAVAL 6 months & older 0.5 ml Prefilled syringe (35881) 11/15/2022    FLUZONE 6 months and older PFS 0.5 ml (31655) 10/03/2016    Influenza 10/17/2020    MMR 05/02/2015    TDAP 04/10/2023       Objective:  /74   Pulse 94   Ht 61.5\"   Wt 240 lb (108.9 kg)   LMP 04/21/2025 (Exact Date)   Physical Examination:  General appearance: Well dressed, well nourished in no apparent distress  Neurologic/Psychiatric: Alert and oriented to person, place and time, mood normal, affect appropriate  Head: Normocephalic without obvious deformity, atraumatic  Neck: No thyromegaly, supple, non-tender, no masses, no adenopathy  Lungs: Clear to  auscultation bilaterally, no rales, wheezes or rhonchi  Breasts: Symmetric, non-tender, no masses, lesions, retraction, dimpling or discharge bilaterally, no axillary or supraclavicular lymphadenopathy  Heart:: Regular rate and rhythm, no gallops or murmurs  Abdomen: Soft, non-tender, non-distended, no masses, no hepatosplenomegaly, no hernias, no inguinal lymphadenopathy  Pelvic:    External genitalia- Normal, Bartholin's, urethra, skeins glands normal   Vagina- No vaginal lesions, no discharge   Cervix- No lesions, long/closed, no cervical motion tenderness   Uterus- Normal sized, anteverted, non-tender, no masses   Adnexa-  Non-tender, no masses  Extremities: Non-tender, full range of motion, no clubbing, cyanosis or edema  Skin:  General inspection- no rashes, lesions or discoloration  Rectum: No hemorrhoids, no masses.    Assessment/Plan:  Normal well-woman exam.  Pap/HPV smear obtained.  Call with positive UCG    Patient offered chaperone for exam, declined    Diagnoses and all orders for this visit:    Encounter for gynecological examination without abnormal finding    Screening for cervical cancer  -     ThinPrep PAP Smear; Future  -     Hpv Dna  High Risk , Thin Prep Collect; Future      Return in about 1 year (around 5/7/2026) for Annual Well Woman Exam.

## 2025-05-08 LAB — HPV E6+E7 MRNA CVX QL NAA+PROBE: NEGATIVE

## 2025-06-13 ENCOUNTER — OFFICE VISIT (OUTPATIENT)
Dept: FAMILY MEDICINE CLINIC | Facility: CLINIC | Age: 35
End: 2025-06-13
Payer: COMMERCIAL

## 2025-06-13 ENCOUNTER — LAB ENCOUNTER (OUTPATIENT)
Dept: LAB | Age: 35
End: 2025-06-13
Attending: FAMILY MEDICINE
Payer: COMMERCIAL

## 2025-06-13 VITALS
HEIGHT: 61.5 IN | TEMPERATURE: 98 F | OXYGEN SATURATION: 99 % | HEART RATE: 98 BPM | SYSTOLIC BLOOD PRESSURE: 104 MMHG | DIASTOLIC BLOOD PRESSURE: 72 MMHG | BODY MASS INDEX: 44.73 KG/M2 | RESPIRATION RATE: 16 BRPM | WEIGHT: 240 LBS

## 2025-06-13 DIAGNOSIS — R74.8 ACID PHOSPHATASE ELEVATED: Primary | ICD-10-CM

## 2025-06-13 DIAGNOSIS — K76.0 FATTY LIVER: ICD-10-CM

## 2025-06-13 DIAGNOSIS — Z00.00 LABORATORY EXAM ORDERED AS PART OF ROUTINE GENERAL MEDICAL EXAMINATION: ICD-10-CM

## 2025-06-13 DIAGNOSIS — R10.11 RUQ ABDOMINAL PAIN: ICD-10-CM

## 2025-06-13 DIAGNOSIS — R74.8 ELEVATED LIVER ENZYMES: Primary | ICD-10-CM

## 2025-06-13 DIAGNOSIS — N96 HISTORY OF RECURRENT MISCARRIAGES: ICD-10-CM

## 2025-06-13 DIAGNOSIS — Z01.84 ANTIBODY RESPONSE EXAM: ICD-10-CM

## 2025-06-13 DIAGNOSIS — R74.8 ELEVATED LIVER ENZYMES: ICD-10-CM

## 2025-06-13 PROBLEM — I25.10 ARTERIOSCLEROSIS OF CORONARY ARTERY: Status: RESOLVED | Noted: 2025-02-07 | Resolved: 2025-06-13

## 2025-06-13 PROBLEM — I47.19 ATRIAL TACHYCARDIA (HCC): Status: RESOLVED | Noted: 2025-02-04 | Resolved: 2025-06-13

## 2025-06-13 LAB
ALBUMIN SERPL-MCNC: 5 G/DL (ref 3.2–4.8)
ALBUMIN/GLOB SERPL: 1.9 {RATIO} (ref 1–2)
ALP LIVER SERPL-CCNC: 88 U/L (ref 37–98)
ALT SERPL-CCNC: 40 U/L (ref 10–49)
ANION GAP SERPL CALC-SCNC: 11 MMOL/L (ref 0–18)
AST SERPL-CCNC: 31 U/L (ref ?–34)
BASOPHILS # BLD AUTO: 0.04 X10(3) UL (ref 0–0.2)
BASOPHILS NFR BLD AUTO: 0.6 %
BILIRUB SERPL-MCNC: 0.5 MG/DL (ref 0.3–1.2)
BUN BLD-MCNC: 13 MG/DL (ref 9–23)
CALCIUM BLD-MCNC: 9.8 MG/DL (ref 8.7–10.6)
CERULOPLASMIN SERPL-MCNC: 36 MG/DL (ref 20–60)
CHLORIDE SERPL-SCNC: 103 MMOL/L (ref 98–112)
CHOLEST SERPL-MCNC: 200 MG/DL (ref ?–200)
CO2 SERPL-SCNC: 25 MMOL/L (ref 21–32)
CREAT BLD-MCNC: 0.79 MG/DL (ref 0.55–1.02)
DEPRECATED HBV CORE AB SER IA-ACNC: 89 NG/ML (ref 50–306)
EGFRCR SERPLBLD CKD-EPI 2021: 101 ML/MIN/1.73M2 (ref 60–?)
EOSINOPHIL # BLD AUTO: 0.12 X10(3) UL (ref 0–0.7)
EOSINOPHIL NFR BLD AUTO: 1.7 %
ERYTHROCYTE [DISTWIDTH] IN BLOOD BY AUTOMATED COUNT: 11.8 %
FASTING PATIENT LIPID ANSWER: YES
FASTING STATUS PATIENT QL REPORTED: YES
GGT SERPL-CCNC: 151 U/L (ref ?–38)
GLOBULIN PLAS-MCNC: 2.7 G/DL (ref 2–3.5)
GLUCOSE BLD-MCNC: 89 MG/DL (ref 70–99)
HAV IGM SER QL: NONREACTIVE
HBV CORE IGM SER QL: NONREACTIVE
HBV SURFACE AG SERPL QL IA: NONREACTIVE
HCT VFR BLD AUTO: 43.7 % (ref 35–48)
HCV AB SERPL QL IA: NONREACTIVE
HDLC SERPL-MCNC: 67 MG/DL (ref 40–59)
HGB BLD-MCNC: 15.1 G/DL (ref 12–16)
IMM GRANULOCYTES # BLD AUTO: 0.02 X10(3) UL (ref 0–1)
IMM GRANULOCYTES NFR BLD: 0.3 %
LDLC SERPL CALC-MCNC: 124 MG/DL (ref ?–100)
LYMPHOCYTES # BLD AUTO: 1.66 X10(3) UL (ref 1–4)
LYMPHOCYTES NFR BLD AUTO: 23.9 %
MCH RBC QN AUTO: 31 PG (ref 26–34)
MCHC RBC AUTO-ENTMCNC: 34.6 G/DL (ref 31–37)
MCV RBC AUTO: 89.7 FL (ref 80–100)
MONOCYTES # BLD AUTO: 0.53 X10(3) UL (ref 0.1–1)
MONOCYTES NFR BLD AUTO: 7.6 %
NEUTROPHILS # BLD AUTO: 4.58 X10 (3) UL (ref 1.5–7.7)
NEUTROPHILS # BLD AUTO: 4.58 X10(3) UL (ref 1.5–7.7)
NEUTROPHILS NFR BLD AUTO: 65.9 %
NONHDLC SERPL-MCNC: 133 MG/DL (ref ?–130)
OSMOLALITY SERPL CALC.SUM OF ELEC: 288 MOSM/KG (ref 275–295)
PLATELET # BLD AUTO: 383 10(3)UL (ref 150–450)
POTASSIUM SERPL-SCNC: 3.8 MMOL/L (ref 3.5–5.1)
PROT SERPL-MCNC: 7.7 G/DL (ref 5.7–8.2)
RBC # BLD AUTO: 4.87 X10(6)UL (ref 3.8–5.3)
SODIUM SERPL-SCNC: 139 MMOL/L (ref 136–145)
TRIGL SERPL-MCNC: 49 MG/DL (ref 30–149)
TSI SER-ACNC: 2.24 UIU/ML (ref 0.55–4.78)
VLDLC SERPL CALC-MCNC: 9 MG/DL (ref 0–30)
WBC # BLD AUTO: 7 X10(3) UL (ref 4–11)

## 2025-06-13 PROCEDURE — 85025 COMPLETE CBC W/AUTO DIFF WBC: CPT

## 2025-06-13 PROCEDURE — 80074 ACUTE HEPATITIS PANEL: CPT

## 2025-06-13 PROCEDURE — 86038 ANTINUCLEAR ANTIBODIES: CPT

## 2025-06-13 PROCEDURE — 82390 ASSAY OF CERULOPLASMIN: CPT

## 2025-06-13 PROCEDURE — 82977 ASSAY OF GGT: CPT

## 2025-06-13 PROCEDURE — 86225 DNA ANTIBODY NATIVE: CPT

## 2025-06-13 PROCEDURE — 36415 COLL VENOUS BLD VENIPUNCTURE: CPT

## 2025-06-13 PROCEDURE — 84443 ASSAY THYROID STIM HORMONE: CPT

## 2025-06-13 PROCEDURE — 80061 LIPID PANEL: CPT

## 2025-06-13 PROCEDURE — 80053 COMPREHEN METABOLIC PANEL: CPT

## 2025-06-13 PROCEDURE — 83516 IMMUNOASSAY NONANTIBODY: CPT

## 2025-06-13 PROCEDURE — 99215 OFFICE O/P EST HI 40 MIN: CPT | Performed by: FAMILY MEDICINE

## 2025-06-13 PROCEDURE — 82728 ASSAY OF FERRITIN: CPT

## 2025-06-13 NOTE — PROGRESS NOTES
The following individual(s) verbally consented to be recorded using ambient AI listening technology and understand that they can each withdraw their consent to this listening technology at any point by asking the clinician to turn off or pause the recording:    Patient name: Brenda Sofian Bonnevier   Additional names:

## 2025-06-13 NOTE — PROGRESS NOTES
Family Medicine Progress Note    Assessment & Plan:     Follow-Up: Return in about 4 weeks (around 7/11/2025) for Annual.  Plans for comprehensive evaluation post-tests and referrals.  - Follow up in one month for lab and ultrasound review.         Assessment & Plan  Elevated liver enzymes  RUQ abdominal pain  Fatty liver  Elevated liver enzymes postpartum-- 6/8/23- persistently elevated;  No Alcohol or hepatotoxic meds;  hasn't had complete W/U.  ABD US done, with Fatty Liver; normal GB;  Lipids had been normal.    with confirmed fatty liver. Differential includes autoimmune contribution vs NAFLD.   - Labs as ordered.   - Repeat abdominal ultrasound.  - Refer to [facility] for evaluation.  - Follow up in one month for lab and ultrasound review.  Orders:    Comp Metabolic Panel (14); Future; Expected date: 06/13/2025    GGT (Gamma Glutamyl Transpeptidase); Future; Expected date: 06/13/2025    Hepatitis Panel, Acute (4); Future; Expected date: 06/13/2025    Ferritin; Future; Expected date: 06/13/2025    Ceruloplasmin; Future; Expected date: 06/13/2025    Actin (Smooth Muscle) Antibody; Future; Expected date: 06/13/2025    Mitochondrial (M2) Antibody; Future; Expected date: 06/13/2025    US ABDOMEN COMPLETE (CPT=76700); Future; Expected date: 06/13/2025    GASTRO - INTERNAL    RAF IFA SCREEN W/REFL TO TITER AND PATTERN, IFA [249] [Q}    Antibody response exam  Noted on Prenatatl Type and Screen  Orders:    RAF IFA SCREEN W/REFL TO TITER AND PATTERN, IFA [249] [Q}    History of recurrent miscarriages    Laboratory exam ordered as part of routine general medical examination  Orders:    CBC With Differential With Platelet; Future; Expected date: 06/13/2025    TSH W Reflex To Free T4; Future; Expected date: 06/13/2025    Lipid Panel; Future; Expected date: 06/13/2025           FOLLOW-UP: Return in about 4 weeks (around 7/11/2025) for Annual.     Subjective:    CC: Establish Care and Other (Abdominal discomfort. H/o   elevated liver enzymes. )  History of Present Illness:  History obtained from patient.     Taylor Lynn Bonnevier is a 34 year old female who presents for Establish Care and Other (Abdominal discomfort. H/o  elevated liver enzymes. )       History of Present Illness  Taylor Lynn Bonnevier is a 34 year old female with fatty liver who presents with concerns about elevated liver enzymes and intermittent abdominal discomfort.    ELEVATED LIVER ENZYMES-   Has been noted since birth of her son 23-two abd US with Fatty infiltration.  No additional w/u  ER in  with SOB/chest pain---> cardiac w/u negative   recent intermittent abdominal discomfort over the past two weeks, rating it as a 1 to 2 out of 10 in severity.   dull and achy, lasting about 20 seconds, and occurs sporadically without a clear pattern or association with meals.   located in the upper abdomen.   No nausea, vomiting, blood in stool, or association of pain with eating.    Family history reveals her mother has elevated liver enzymes and fatty liver, with a previous workup by a GI doctor. Her mother also had a positive RAF panel but no definitive autoimmune diagnosis. There is no known family history of autoimmune diseases, but her father was adopted, limiting knowledge of his family history.    She has antibodies on her red blood cells discovered during pregnancy, described as 'undetermined specificity.' She has no history of abnormal Pap smears and her menstrual cycles have been regular.         +Antibody Screen on prenatal type and cross  Binge Eating Dis- ongoing struggle, prev effective weight loss through [facility], but then went off meds with preg.    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   Ob/gyne: Patient's last menstrual period was 2025 (exact date).. last pap: 2025, last mammogram: -,  ,   Colonoscopy: -      History/Other:   ROS-Per  HPI     Problem List:  Problem List[1]    Current Medications:  Current Medications[2]   Past Medical History:  Past Medical History[3]   Past Surgical History:  Past Surgical History[4]   Family History:  Family History[5]   Social History:  Short Social Hx on File[6]    Allergies:  Allergies[7]     Objective:    VITALS: /72   Pulse 98   Temp 97.8 °F (36.6 °C) (Temporal)   Resp 16   Ht 5' 1.5\" (1.562 m)   Wt 240 lb (108.9 kg)   LMP 05/21/2025 (Exact Date)   SpO2 99%   BMI 44.61 kg/m²      BP Readings from Last 3 Encounters:   06/13/25 104/72   05/07/25 126/74   03/30/25 (!) 138/98     Wt Readings from Last 3 Encounters:   06/13/25 240 lb (108.9 kg)   05/07/25 240 lb (108.9 kg)   03/30/25 235 lb (106.6 kg)         PHYSICAL EXAM  GEN: pleasant, well-appearing in NAD, AOX3  SKIN: no visible rashes, lesions, or evidence of trauma  HEENT: PERRL, EOMI, moist mucous membranes,   CV: RRR, no murmurs or abnl heart sounds   PULM: Clear to auscultation, No wheezes, rales, rhonchi.  Non-labored breathing.  ABD: Soft, non-tender, non-distended, + BS, no rigidity/guarding  EXT:  + pitting LE edema.  No calf tenderness  NEURO: CNs grossly intact, no focal weakness  MSK: moves all 4 extremities without difficulty  PSYCH: mood and affect are appropriate     AST   Date Value Ref Range Status   02/24/2025 37 (H) <34 U/L Final   01/25/2025 57 (H) <34 U/L Final   08/09/2023 31 15 - 37 U/L Final   06/13/2023 75 (H) 15 - 37 U/L Final   06/12/2023 124 (H) 15 - 37 U/L Final   06/11/2023 65 (H) 15 - 37 U/L Final     ALT   Date Value Ref Range Status   02/24/2025 56 (H) 10 - 49 U/L Final   01/25/2025 105 (H) 10 - 49 U/L Final   08/09/2023 52 13 - 56 U/L Final   06/13/2023 120 (H) 13 - 56 U/L Final   06/12/2023 143 (H) 13 - 56 U/L Final   06/11/2023 58 (H) 13 - 56 U/L Final     Alkaline Phosphatase   Date Value Ref Range Status   02/24/2025 104 (H) 37 - 98 U/L Final   01/25/2025 128 (H) 37 - 98 U/L Final   08/09/2023 135 (H) 37  - 98 U/L Final   2023 135 (H) 37 - 98 U/L Final   2023 138 (H) 37 - 98 U/L Final   2023 118 (H) 37 - 98 U/L Final       Approximately 45 minutes was spent: preparing to see the patient (reviewing prior tests, office notes, and consultant notes), personally obtaining a history, conducting a physical exam, counseling the patient on the plan of care, entering appropriate orders, and documenting clinical information in the electronic health record.           Tara Fuentes, DO    NOTE TO PATIENT: The  Cures Act makes clinical notes like these available to patients in the interest of transparency. Clinical notes are medical documents used by physicians and care providers to communicate with each other. These documents include medical language and terminology, abbreviations, and treatment information that may sound technical and at times possibly unfamiliar. In addition, at times, the verbiage may appear blunt or direct. These documents are one tool providers use to communicate relevant information and clinical opinions of the care providers in a way that allows common understanding of the clinical context.           [1]   Patient Active Problem List  Diagnosis    Binge eating    Shifting sleep-work schedule, affecting sleep    Antibody response exam    Previous  section    Fatty liver    History of recurrent miscarriages   [2]   Current Outpatient Medications   Medication Sig Dispense Refill    Prenatal Vit-DSS-Fe Cbn-FA (PRENATAL AD OR) Take by mouth.      permethrin 5 % External Cream APPLY TO ALL AREAS OF THE BODY FROM THE NECK DOWN ONCE THEN WASH OFF AFTER 8-14 HOURS      triamcinolone 0.1 % External Cream 1 Ring every 28 days. FOR 21 DAYS IN, THEN REMOVE FOR 7 DAYS     [3]   Past Medical History:   Allergic rhinitis    Arteriosclerosis of coronary artery    Atrial tachycardia (HCC)    Binge eating    Obesity    Obesity (BMI 30-39.9)    Ovarian cyst    Shifting sleep-work  schedule, affecting sleep   [4]   Past Surgical History:  Procedure Laterality Date      2023    PLTCS at 39w2d on 23 for prolonged latent phase of labor after IOL for obesity (pre-pregnancy BMI 35.7 with excessive weight gain 32 lb). Presented for IOL. She received cervical ripening balloon, cytotec per protocol, pitocin per protocol and AROM performed. She did not make cervical change for over 14 hours from 3 cm. Patient offered and requested PCS.   [5]   Family History  Problem Relation Age of Onset    Hypertension Mother     Stroke Father     Cancer Maternal Grandmother     Other (lung cancer) Maternal Grandmother     Other (chf) Maternal Grandfather    [6]   Social History  Socioeconomic History    Marital status: Single   Tobacco Use    Smoking status: Never    Smokeless tobacco: Never   Vaping Use    Vaping status: Never Used   Substance and Sexual Activity    Alcohol use: Not Currently    Drug use: Never    Sexual activity: Yes     Partners: Male     Birth control/protection: Condom   Other Topics Concern    Caffeine Concern Yes     Comment: 2-3 a night of crystal light energy    Exercise Yes     Comment: walks her dogs    Seat Belt Yes    Special Diet No    Stress Concern No     Social Drivers of Health     Food Insecurity: No Food Insecurity (2025)    NCSS - Food Insecurity     Worried About Running Out of Food in the Last Year: No     Ran Out of Food in the Last Year: No   Transportation Needs: No Transportation Needs (2025)    NCSS - Transportation     Lack of Transportation: No   Stress: No Stress Concern Present (2023)    Stress     Feeling of Stress : No   Housing Stability: Not At Risk (2025)    NCSS - Housing/Utilities     Has Housing: Yes     Worried About Losing Housing: No     Unable to Get Utilities: No   [7]   Allergies  Allergen Reactions    Seasonal ITCHING

## 2025-06-13 NOTE — ASSESSMENT & PLAN NOTE
Elevated liver enzymes postpartum-- 6/8/23- persistently elevated;  No Alcohol or hepatotoxic meds;  hasn't had complete W/U.  ABD US done, with Fatty Liver; normal GB;  Lipids had been normal.    with confirmed fatty liver. Differential includes autoimmune contribution vs NAFLD.   - Labs as ordered.   - Repeat abdominal ultrasound.  - Refer to [facility] for evaluation.  - Follow up in one month for lab and ultrasound review.  Orders:    Comp Metabolic Panel (14); Future; Expected date: 06/13/2025    GGT (Gamma Glutamyl Transpeptidase); Future; Expected date: 06/13/2025    Hepatitis Panel, Acute (4); Future; Expected date: 06/13/2025    Ferritin; Future; Expected date: 06/13/2025    Ceruloplasmin; Future; Expected date: 06/13/2025    Actin (Smooth Muscle) Antibody; Future; Expected date: 06/13/2025    Mitochondrial (M2) Antibody; Future; Expected date: 06/13/2025    US ABDOMEN COMPLETE (CPT=76700); Future; Expected date: 06/13/2025    GASTRO - INTERNAL    RAF IFA SCREEN W/REFL TO TITER AND PATTERN, IFA [249] [Q}

## 2025-06-13 NOTE — ASSESSMENT & PLAN NOTE
Noted on Prenatatl Type and Screen  Orders:    RAF IFA SCREEN W/REFL TO TITER AND PATTERN, IFA [249] [Q}

## 2025-06-14 LAB
ACTIN SMOOTH MUSCLE AB: 34 UNITS
M2 MITOCHONDRIAL AB: <20 UNITS

## 2025-06-19 LAB
DSDNA IGG SERPL IA-ACNC: 1.3 IU/ML (ref ?–10)
ENA AB SER QL IA: 0.6 UG/L (ref ?–0.7)
ENA AB SER QL IA: NEGATIVE

## 2025-07-08 ENCOUNTER — HOSPITAL ENCOUNTER (OUTPATIENT)
Dept: ULTRASOUND IMAGING | Age: 35
Discharge: HOME | End: 2025-07-08
Attending: FAMILY MEDICINE
Payer: COMMERCIAL

## 2025-07-08 DIAGNOSIS — R74.8 ELEVATED LIVER ENZYMES: ICD-10-CM

## 2025-07-08 PROCEDURE — 76700 US EXAM ABDOM COMPLETE: CPT | Performed by: FAMILY MEDICINE

## 2025-07-18 ENCOUNTER — TELEPHONE (OUTPATIENT)
Dept: FAMILY MEDICINE CLINIC | Facility: CLINIC | Age: 35
End: 2025-07-18

## 2025-07-18 ENCOUNTER — OFFICE VISIT (OUTPATIENT)
Dept: FAMILY MEDICINE CLINIC | Facility: CLINIC | Age: 35
End: 2025-07-18
Payer: COMMERCIAL

## 2025-07-18 VITALS
WEIGHT: 239 LBS | OXYGEN SATURATION: 99 % | SYSTOLIC BLOOD PRESSURE: 104 MMHG | TEMPERATURE: 97 F | HEART RATE: 84 BPM | HEIGHT: 61.5 IN | DIASTOLIC BLOOD PRESSURE: 76 MMHG | BODY MASS INDEX: 44.55 KG/M2 | RESPIRATION RATE: 16 BRPM

## 2025-07-18 DIAGNOSIS — Z00.01 ENCOUNTER FOR GENERAL ADULT MEDICAL EXAMINATION WITH ABNORMAL FINDINGS: Primary | ICD-10-CM

## 2025-07-18 DIAGNOSIS — N96 HISTORY OF RECURRENT MISCARRIAGES: ICD-10-CM

## 2025-07-18 DIAGNOSIS — R63.2 BINGE EATING: ICD-10-CM

## 2025-07-18 DIAGNOSIS — K76.0 NAFLD (NONALCOHOLIC FATTY LIVER DISEASE): ICD-10-CM

## 2025-07-18 DIAGNOSIS — Z01.84 ANTIBODY RESPONSE EXAM: ICD-10-CM

## 2025-07-18 DIAGNOSIS — E66.813 CLASS 3 SEVERE OBESITY DUE TO EXCESS CALORIES WITH SERIOUS COMORBIDITY AND BODY MASS INDEX (BMI) OF 40.0 TO 44.9 IN ADULT: ICD-10-CM

## 2025-07-18 DIAGNOSIS — R74.8 ELEVATED LIVER ENZYMES: ICD-10-CM

## 2025-07-18 PROCEDURE — 99214 OFFICE O/P EST MOD 30 MIN: CPT | Performed by: FAMILY MEDICINE

## 2025-07-18 PROCEDURE — 99395 PREV VISIT EST AGE 18-39: CPT | Performed by: FAMILY MEDICINE

## 2025-07-18 RX ORDER — LISDEXAMFETAMINE DIMESYLATE 30 MG/1
30 CAPSULE ORAL DAILY
Qty: 30 CAPSULE | Refills: 0 | Status: SHIPPED | OUTPATIENT
Start: 2025-08-18 | End: 2025-09-17

## 2025-07-18 RX ORDER — LISDEXAMFETAMINE DIMESYLATE 30 MG/1
30 CAPSULE ORAL DAILY
Qty: 30 CAPSULE | Refills: 0 | Status: SHIPPED | OUTPATIENT
Start: 2025-07-18 | End: 2025-08-17

## 2025-07-18 RX ORDER — LISDEXAMFETAMINE DIMESYLATE 30 MG/1
30 CAPSULE ORAL DAILY
Qty: 30 CAPSULE | Refills: 0 | Status: SHIPPED | OUTPATIENT
Start: 2025-09-18 | End: 2025-10-18

## 2025-07-18 NOTE — ASSESSMENT & PLAN NOTE
Long-standign history of Binge Eating- prev followed by [facility] with success on Vyvanse,  Has been having hard time with Occasional binge eating episodes, 'food noise'. Vyvanse previously effective.  - Prescribe Vyvanse to manage binge eating symptoms.  Orders:    Lisdexamfetamine Dimesylate; Take 1 capsule (30 mg total) by mouth daily.  Dispense: 30 capsule; Refill: 0    Lisdexamfetamine Dimesylate; Take 1 capsule (30 mg total) by mouth daily.  Dispense: 30 capsule; Refill: 0    Lisdexamfetamine Dimesylate; Take 1 capsule (30 mg total) by mouth daily.  Dispense: 30 capsule; Refill: 0

## 2025-07-18 NOTE — PROGRESS NOTES
Family Medicine Progress Note    Assessment & Plan:     Follow-Up: Return in about 2 months (around 9/18/2025) for FU Weight loss.  Plans for comprehensive evaluation post-tests and referrals.  - Follow up in one month for lab and ultrasound review.          Assessment & Plan  Encounter for general adult medical examination with abnormal findings  Wellness Exam done today and routine Preventative Care discussed as noted below.   -Pap smear: 05/07/2025   up-to-date    -Contraception:  none, monitoring her cycle   -Immunizations:  up-to-date   -Routine labs discussed    -Healthy eating habits and regular exercise encouraged  Class 3 severe obesity due to excess calories with serious comorbidity and body mass index (BMI) of 40.0 to 44.9 in adult  Pt is a candidate for medication assisted weight loss therapy based on BMI and Comorbidities.  (BMI >30 with eg-dobdcvcalro-XOR, NAFLD,).   Brenda has been on weight loss regimen of low-calorie diet, increased physical activity, and behavior modifications for >6 mos and it will be continued while patient is on the medication.    - Bloodwork- up-to-date   - start Wegovy 0.25mg   - Discussed MOA of the RX and possible AE-not limited to N/V/abdominal pain/pancreatitis and pt verbalized understanding  - Denies any PMH/FMH of pancreatitis, pancreatic cancer, thyroid cancer, MEN2   - Comprehensive Lifestyle Intervention to include: Diet, Exercise, Behavioral Modification  - Ensure 8hr of sleep/night and work on stress reduction.    - Discussion about Intermittent Fasting--and Glucose [employer]s (protein/fat 1st, no naked carbs, food order)  - Focus on meeting protein goals-- discussed lean protein options   - Maximize Water intake   - >150m of vigorous activity.  Ideally Strength Training- (still get benefit if Weekend Warrior)    - F/U in 2 mos     Orders:    Semaglutide-Weight Management; Inject 0.5 mL (0.25 mg total) into the skin once a week for 8 doses.  Dispense: 2  mL; Refill: 1    Elevated liver enzymes  NAFLD (nonalcoholic fatty liver disease)  Elevated liver enzymes postpartum-- 6/8/23- persistently elevated- until most recent labs- Anti-SMAb elevated, GGT up, RUQ US with Fatty Liver.  Persistent GGT elevation, unclear etiology, possible autoimmune hepatitis. Family history of fatty liver. Abstains from alcohol and processed foods.  - Order repeat CMP and GGT before GI appointment.  - Order repeat anti-smooth muscle antibody test.  - Refer to GI for further evaluation.  Orders:    Actin (Smooth Muscle) Antibody; Future; Expected date: 07/18/2025    GGT (Gamma Glutamyl Transpeptidase); Future; Expected date: 07/18/2025    Comp Metabolic Panel (14); Future; Expected date: 07/18/2025    Semaglutide-Weight Management; Inject 0.5 mL (0.25 mg total) into the skin once a week for 8 doses.  Dispense: 2 mL; Refill: 1    History of recurrent miscarriages  Concern about future pregnancies, potential autoimmune component. Discussed weight loss benefits and baby aspirin- may be worthwhile to look into.   Antibody response exam  Noted on Prenatatl Type and Screen  Binge eating  Long-standign history of Binge Eating- prev followed by [facility] with success on Vyvanse,  Has been having hard time with Occasional binge eating episodes, 'food noise'. Vyvanse previously effective.  - Prescribe Vyvanse to manage binge eating symptoms.  Orders:    Lisdexamfetamine Dimesylate; Take 1 capsule (30 mg total) by mouth daily.  Dispense: 30 capsule; Refill: 0    Lisdexamfetamine Dimesylate; Take 1 capsule (30 mg total) by mouth daily.  Dispense: 30 capsule; Refill: 0    Lisdexamfetamine Dimesylate; Take 1 capsule (30 mg total) by mouth daily.  Dispense: 30 capsule; Refill: 0           FOLLOW-UP: Return in about 2 months (around 9/18/2025) for FU Weight loss.     Subjective:    CC: Lab Results (review) and Test Results (For US of the abdomen. )  History of Present Illness:  History obtained from patient.      Taylor Lynn Bonnevier is a 34 year old female who presents for Lab Results (review) and Test Results (For US of the abdomen. )       History of Present Illness    ANNUAL PHYSICAL  Menses: Regular without any break through bleeding or concerning symptoms.   LMP: Patient's last menstrual period was 07/13/2025 (exact date).  Contraception:  none - has history of recurrent miscarriage  Cervical Cancer Screening-05/07/2025   PMH of Abnormal Pap: denies  Tobacco: denies   Immunizations: up-to-date     ELEVATED LIVER ENZYMES-   Has been noted since birth of her son 6/8/23-two abd US with Fatty infiltration.  No additional w/u  ER in Jan with SOB/chest pain---> cardiac w/u negative   recent intermittent abdominal discomfort over the past two weeks, rating it as a 1 to 2 out of 10 in severity.   dull and achy, lasting about 20 seconds, and occurs sporadically without a clear pattern or association with meals.   located in the upper abdomen.   No nausea, vomiting, blood in stool, or association of pain with eating.  FMH-MOP with  elevated liver enzymes and fatty liver, GI w/u in the past; + RAF, but no definitive autoimmune diagnosis. There is no known family history of autoimmune diseases, but her father was adopted, limiting knowledge of his family history.  7/2025-Labs:  Actin Smooth muscle Ab-34 (uln 19)  GGT elevated- 151  AST/ALT Normalized on June Labs  RUQ US- Fatty Liver   GI Appt 8/22/25  Since last visit she has cut out alcohol and processed foods; also taking tumeric     WEIGHT LOSS-  How long have you struggled with your weight? 10+ y   Current BMI: Body mass index is 44.43 kg/m².   Diet and exercise program for the past 6 mos?  yes   Prior weight loss counseling/support: Yes- Prev worked with [facility]   Prior Weight loss medications: Vyvanse 30mg, Wellbutrin cravings.    Co-morbidities: HLD, NAFLD, Binge  Diet- No alcohol, Fruit/Vegetables, cut out on processed food, monitoring portions,  cutting abck on  snacking.   Has had ome episodes of binging which hre hard to cotnrol.  Food noise is constant.    Exercise- on her feet at work;  walking daily 10-15K steps.  Work challenge for accountability.    No juices/pops.   No formal strength training. But does run around with 3 yo.   She has struggled with her weight for about ten years and has a history of binge eating disorder. Vyvanse was effective in managing her binge eating and aiding in weight loss, and she has also tried Wellbutrin for cravings. Currently, she is not trying to get pregnant and is considering weight loss options. Her diet includes no alcohol, increased fruits and vegetables, and reduced processed foods. She is working on portion control and cutting out snacking. For physical activity, she walks daily, aiming for 10,000 to 15,000 steps, and participates in a work challenge to stay accountable.       +Antibody Screen on prenatal type and cross  Binge Eating Dis- ongoing struggle, prev effective weight loss through [facility], but then went off meds with preg.    Elevated Liver Enzymes- Since 2023 (post-partum),  A-SMAb +, US-steatosis,.  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   Ob/gyne: Patient's last menstrual period was 2025 (exact date).. last pap: 2025, last mammogram: -,  ,   Colonoscopy: -      History/Other:   ROS-Per HPI     Problem List:  Problem List[1]    Current Medications:  Current Medications[2]   Past Medical History:  Past Medical History[3]   Past Surgical History:  Past Surgical History[4]   Family History:  Family History[5]   Social History:  Short Social Hx on File[6]    Allergies:  Allergies[7]     Objective:    VITALS: /76   Pulse 84   Temp 97.1 °F (36.2 °C) (Temporal)   Resp 16   Ht 5' 1.5\" (1.562 m)   Wt 239 lb (108.4 kg)   LMP 2025 (Exact Date)   SpO2 99%   BMI 44.43 kg/m²      BP Readings from  Last 3 Encounters:   07/18/25 104/76   06/13/25 104/72   05/07/25 126/74     Wt Readings from Last 3 Encounters:   07/18/25 239 lb (108.4 kg)   06/13/25 240 lb (108.9 kg)   05/07/25 240 lb (108.9 kg)         PHYSICAL EXAM  GEN: pleasant, well-appearing in NAD, AOX3  SKIN: no visible rashes, lesions, or evidence of trauma  HEENT: PERRL, EOMI, moist mucous membranes,   CV: RRR, no murmurs or abnl heart sounds   PULM: Clear to auscultation, No wheezes, rales, rhonchi.  Non-labored breathing.  ABD: Soft, non-tender, non-distended, + BS, no rigidity/guarding  eXT:  +nO LE edema.  No calf tenderness  NEURO: CNs grossly intact, no focal weakness  MSK: moves all 4 extremities without difficulty  PSYCH: mood and affect are appropriate     AST   Date Value Ref Range Status   06/13/2025 31 <34 U/L Final   02/24/2025 37 (H) <34 U/L Final   01/25/2025 57 (H) <34 U/L Final   08/09/2023 31 15 - 37 U/L Final   06/13/2023 75 (H) 15 - 37 U/L Final   06/12/2023 124 (H) 15 - 37 U/L Final     ALT   Date Value Ref Range Status   06/13/2025 40 10 - 49 U/L Final   02/24/2025 56 (H) 10 - 49 U/L Final   01/25/2025 105 (H) 10 - 49 U/L Final   08/09/2023 52 13 - 56 U/L Final   06/13/2023 120 (H) 13 - 56 U/L Final   06/12/2023 143 (H) 13 - 56 U/L Final     Alkaline Phosphatase   Date Value Ref Range Status   06/13/2025 88 37 - 98 U/L Final   02/24/2025 104 (H) 37 - 98 U/L Final   01/25/2025 128 (H) 37 - 98 U/L Final   08/09/2023 135 (H) 37 - 98 U/L Final   06/13/2023 135 (H) 37 - 98 U/L Final   06/12/2023 138 (H) 37 - 98 U/L Final         Lab Results   Component Value Date     04/07/2021    A1C 5.3 04/07/2021     Lab Results   Component Value Date/Time    CHOLEST 200 (H) 06/13/2025 09:06 AM    TRIG 49 06/13/2025 09:06 AM    HDL 67 (H) 06/13/2025 09:06 AM     (H) 06/13/2025 09:06 AM    NONHDLC 133 (H) 06/13/2025 09:06 AM       Lab Results   Component Value Date    WBC 7.0 06/13/2025    RBC 4.87 06/13/2025    HGB 15.1 06/13/2025     HCT 43.7 2025    MCV 89.7 2025    MCH 31.0 2025    MCHC 34.6 2025    RDW 11.8 2025    .0 2025      Lab Results   Component Value Date    GLU 89 2025    BUN 13 2025    BUNCREA 12.6 2021    CREATSERUM 0.79 2025    ANIONGAP 11 2025    GFRNAA 118 2022    GFRAA 135 2022    CA 9.8 2025    OSMOCALC 288 2025    ALKPHO 88 2025    AST 31 2025    ALT 40 2025    BILT 0.5 2025    TP 7.7 2025    ALB 5.0 (H) 2025    GLOBULIN 2.7 2025     2025    K 3.8 2025     2025    CO2 25.0 2025              Tara Fuentes,     NOTE TO PATIENT: The  Cent[employer] Cures Act makes clinical notes like these available to patients in the interest of transparency. Clinical notes are medical documents used by physicians and care providers to communicate with each other. These documents include medical language and terminology, abbreviations, and treatment information that may sound technical and at times possibly unfamiliar. In addition, at times, the verbiage may appear blunt or direct. These documents are one tool providers use to communicate relevant information and clinical opinions of the care providers in a way that allows common understanding of the clinical context.           [1]   Patient Active Problem List  Diagnosis    Binge eating    Shifting sleep-work schedule, affecting sleep    Antibody response exam    Previous  section    NAFLD (nonalcoholic fatty liver disease)    History of recurrent miscarriages    Class 3 severe obesity due to excess calories with serious comorbidity and body mass index (BMI) of 40.0 to 44.9 in adult   [2]   Current Outpatient Medications   Medication Sig Dispense Refill    lisdexamfetamine (VYVANSE) 30 MG Oral Cap Take 1 capsule (30 mg total) by mouth daily. 30 capsule 0    [START ON 2025] lisdexamfetamine (VYVANSE) 30 MG Oral  Cap Take 1 capsule (30 mg total) by mouth daily. 30 capsule 0    [START ON 2025] lisdexamfetamine (VYVANSE) 30 MG Oral Cap Take 1 capsule (30 mg total) by mouth daily. 30 capsule 0    semaglutide-weight management 0.25 MG/0.5ML Subcutaneous Solution Auto-injector Inject 0.5 mL (0.25 mg total) into the skin once a week for 8 doses. 2 mL 1    permethrin 5 % External Cream APPLY TO ALL AREAS OF THE BODY FROM THE NECK DOWN ONCE THEN WASH OFF AFTER 8-14 HOURS      triamcinolone 0.1 % External Cream 1 Ring every 28 days. FOR 21 DAYS IN, THEN REMOVE FOR 7 DAYS      Prenatal Vit-DSS-Fe Cbn-FA (PRENATAL AD OR) Take by mouth.     [3]   Past Medical History:   Allergic rhinitis    Arteriosclerosis of coronary artery    Atrial tachycardia (HCC)    Binge eating    Obesity    Obesity (BMI 30-39.9)    Ovarian cyst    Shifting sleep-work schedule, affecting sleep   [4]   Past Surgical History:  Procedure Laterality Date      2023    PLTCS at 39w2d on 23 for prolonged latent phase of labor after IOL for obesity (pre-pregnancy BMI 35.7 with excessive weight gain 32 lb). Presented for IOL. She received cervical ripening balloon, cytotec per protocol, pitocin per protocol and AROM performed. She did not make cervical change for over 14 hours from 3 cm. Patient offered and requested PCS.   [5]   Family History  Problem Relation Age of Onset    Hypertension Mother     Stroke Father     Cancer Maternal Grandmother     Other (lung cancer) Maternal Grandmother     Other (chf) Maternal Grandfather    [6]   Social History  Socioeconomic History    Marital status: Single   Tobacco Use    Smoking status: Never    Smokeless tobacco: Never   Vaping Use    Vaping status: Never Used   Substance and Sexual Activity    Alcohol use: Not Currently    Drug use: Never    Sexual activity: Yes     Partners: Male     Birth control/protection: Condom   Other Topics Concern    Caffeine Concern Yes     Comment: 2-3 a night of crystal  light energy    Exercise Yes     Comment: walks her dogs    Seat Belt Yes    Special Diet No    Stress Concern No     Social Drivers of Health     Food Insecurity: No Food Insecurity (6/13/2025)    NCSS - Food Insecurity     Worried About Running Out of Food in the Last Year: No     Ran Out of Food in the Last Year: No   Transportation Needs: No Transportation Needs (6/13/2025)    NCSS - Transportation     Lack of Transportation: No   Stress: No Stress Concern Present (6/6/2023)    Stress     Feeling of Stress : No   Housing Stability: Not At Risk (6/13/2025)    NCSS - Housing/Utilities     Has Housing: Yes     Worried About Losing Housing: No     Unable to Get Utilities: No   [7]   Allergies  Allergen Reactions    Seasonal ITCHING

## 2025-07-18 NOTE — PATIENT INSTRUCTIONS
Why is everyone talking about the Glucose Goddess hacks?  The Glucose Goddess.  Navigating your day with sustained energy and sharp focus is all about smart blood glucose control.  The following glucose hacks are small yet effective changes that will transform how you eat, directly inspired by Katie Ellis, the acclaimed  and “Glucose Goddess”.    With over 1.5 million followers rallying behind her Glucose Goddess movement and her international bestseller, “Glucose Revolution,” Katie is making blood sugar management understandable and achievable for people everywhere.    Glucose hacks can help you with  Improved energy levels  Weight management  Reduced chronic disease risk  Enhanced cognitive function  Better sleep quality  Improved exercise performance  Improved nutrition  Emotional stability  So without wasting any more time, let’s learn about the Glucose Goddess hacks.    1. Eat foods in the right order  Hack 1: Eat foods in the right order  Think of your meal as a queue where everyone - or in this case, every nutrient - should wait for its turn. Begin with your veggies, followed by fats and proteins, and opt for sugars and starches last.  Kicking off with vegetables and proteins is a clever move because they line up your digestive system for a slower glucose release.  In a nutshell, the vegetables act like the bouncers of your gut, letting sugars in slowly thanks to their fibre.  Proteins then enter the fray, demanding more attention from your digestive enzymes, which slows down the rush of carbohydrates that may come next.  By pacing your plate with veggies and proteins first, you’re dialling down the speed of sugar absorption  This trick not only helps dodging sugar spikes; but also allows your hormones like insulin to play in your favour, helping with weight management and keeping you full longer.  Eating in the right order will lead to steady energy levels and a daily metabolic cycle aligned  with your routine.    2: Let the starters be veggies  Hack 2: Veggie starters  When it comes to calories, think of them as guests at a party. The ones from fructose can be a bit like gatecrashers - they tend to overstay their welcome and cause more trouble, especially when compared to their glucose counterparts. So how do you host this party right?     The easy trick is to begin with the veggies as starters. These are the guests that improve the vibe from the get-go, mingling well with others (e.g. your metabolism) and setting a chill tone for the evening (or in this case, your blood sugar levels).    It’s not just about their ability to make the party look bigger and better; it’s what they carry with them - an abundance of nutrients and fibres that prep your system to handle whatever comes next[2].     With this hack, you’re preparing for a smooth ride on the glucose curve. Think of them as the warm-up act to your meal, setting the stage for nutritional success. This way, you can enjoy the foods you love, whilst still nourishing your body.    3: Go for a savoury breakfast  Hack 3: Savoury breakfast -Opting for a savoury breakfast is particularly beneficial after the overnight fast your body undergoes during sleep. Upon waking, your body is highly sensitive to insulin, and introducing sugars early can cause an abrupt spike in insulin levels, leading to rapid fluctuations in blood sugar.   These spikes are harmful. They can not only lead to energy crashes later on, but also trigger increased hunger and cravings throughout the day.  A breakfast focused on protein, fat, and fibre aids in a slow, steady rise in blood glucose and insulin levels[3].   This more gradual increase is conducive to maintaining energy levels, improving satiety, and regulating appetite, setting a stable metabolic tone for the day ahead.  Even the addition of whole fruits should be modest, as the goal is to keep the sweet taste without making it  the centrepiece that could potentially lead to a glucose spike.    4: All sugars are equal-  Fabi 4: All sugars are equal- When it comes to sugar, your body sees all types as equals. Whether it’s honey, brown sugar, syrups, or white table sugar, they all break down into glucose and fructose.  There’s a common misconception that some sugars are healthier than others, like sucralose or high-fructose corn syrup, but the reality is, excess intake of any sugar can be detrimental to health.   Once consumed, all sugars break down and impact your blood sugar levels relatively the same way.  Whilst some natural sweeteners contain trace nutrients, the amount of sugar you’d have to consume to gain any health benefit would far outweigh the nutritional value.   The bottom line is that moderation is crucial, regardless of the type of sugar. The focus should be on limiting intake rather than choosing a supposedly healthier option, as too much of any sugar can contribute to health issues such as weight gain, blood sugar imbalances, and increased risk of chronic diseases.    5: Desserts are better than snacks  Fabi 5: Dessert over snack  Indulging in a dessert rather than reaching for a sweet snack in between meals, can be a strategic choice for managing blood sugar levels.  When you enjoy a sweet treat as a standalone snack, there’s nothing to buffer the sugar as it enters your bloodstream, leading to a luevano rise in glucose levels.  When you have that same treat after a balanced meal, the other nutrients present--proteins, fats, and fibres--act as a moderating influence.   They slow down the digestion and absorption of the sugar, leading to a more gradual increase in blood sugar and insulin.  This doesn’t just help in maintaining steadier energy levels; it can also prevent the quick spike-and-crash cycle that can leave you feeling tired and hungry soon after a sugar-laden snack.  So, if you’re going to treat yourself to something  sweet, doing it after a meal is a more blood sugar-friendly approach.    6: Start appreciating vinegar more  Hachristal 6: Vinegar -Vinegar’s acetic acid is a powerful substance in the quest for balanced blood sugar.  Incorporating just a tablespoon of vinegar into your diet--perhaps mixed into a glass of water or used as a salad dressing--before meals can make a significant difference.  Scientific studies suggest that vinegar can reduce the glycemic impact of a meal by up to 20%, dampening the post-meal blood sugar spike[4].   This effect is particularly beneficial for those looking to manage their glycemic response after eating. The acetic acid in vinegar works by influencing the rate of gastric emptying and enhancing the uptake of glucose by bodily tissues, which together, help in moderating blood sugar levels.  Vinegar may also improve insulin sensitivity, making your body’s response to sugar more efficient. This ancient kitchen staple, holds modern-day relevance for anyone mindful of their metabolic health.    7: Move your body after eating  Fabi 7: Post meal movement  Engaging in light physical activity after eating does more than just burn calories: it activates crucial physiological responses that help balance blood sugar levels.  When you exercise, your muscles contract and this movement signals them to take up more glucose from the bloodstream to use as fuel.   This process occurs independently of insulin, which is particularly beneficial immediately after a meal when glucose levels are at their highest.  Even a short period of movement, such as a 10-minute walk, doing dishes, or some light stretching, encourages your muscles to use glucose for energy, thus lowering the amount that remains in your bloodstream.   Over time, your body becomes better at naturally regulating blood sugar levels. Remember, the activity doesn’t have to be intense; it’s the act of moving that counts.     8: If you want snacks, get the  savoury ones  You’re craving snacks in between work, but do not want to have a heavy meal? Opting for savoury snacks will be particularly advantageous to sidestep the less favourable effects of sweet snacks.  Whilst fruits can offer a sweet taste coupled with beneficial fibres and nutrients, other common sweet snacks often come packed with added sugars and refined carbohydrates.   These can cause rapid blood sugar spikes followed by sharp declines, leading to a cycle of energy dips and renewed cravings.  Savoury snacks could be vegetables, nuts, seeds, and whole grains that bring substantial protein and healthy fats to the table.   These nutrients embark on a slower digestion process, providing a stable release of energy and keeping blood sugar levels on an even keel.  Whether it’s a crunchy serving of raw veggies with hummus or a handful of almonds, turning to savoury options can support your blood sugar management, whilst also contributing to overall nutrient intake.    9: Don’t eat your carbs naked  Clothes over carbs  Pairing carbohydrates with protein, fat, or fibre is like giving them a  that moderates their effect on your blood sugar.  This combination approach is alston to slowing down the absorption of glucose into your bloodstream.  When you eat carbs alone, they’re typically digested quickly, leading to that all-too-familiar sugar rush and subsequent crash.  But when you ‘clothe’ your carbs with other nutrients, you’re essentially creating a time-release system for glucose.  Proteins and fats take longer to digest, which means the glucose from any accompanying carbohydrates also enters your bloodstream more gradually.  Fibre acts as a regulator, forming a gel-like substance in the gut that slows down the whole process. By adding these ‘clothes’ to your carbs, you’re not just dressing up your meal, you’re prepping your body for a steadier, more sustained energy release.    10: Stop counting  calories  Stop calorie counting  the practice of counting calories for better health is an oversimplification of a much more complex nutritional landscape.  It’s becoming increasingly clear that not all calories wield the same effects on health and metabolic outcomes.  200 calories of vegetables, lean protein, or whole grains, for example, will affect your body differently than 200 calories of sugary snacks.  Some calories may deliver essential nutrients and compounds that contribute to satiety, energy levels, and overall metabolic health, whilst others can cause blood sugar levels to spike and crash, possibly leading to increased hunger and overeating.  Consider calories derived from fructose and those from glucose. Whilst both are sugars that contribute to your daily caloric intake, their influence on the body is quite different.  Fructose, particularly in its processed forms like high-fructose corn syrup, can be metabolically taxing, often leading to insulin resistance and fatty liver when consumed in excess.  On the other hand, glucose is a primary energy source that your body can handle more efficiently. Shift your emphasis from quantity to quality of the calories consumed.    The nutritional value and source of the calories are the most important.    A calorie is not just a calorie; it is a potential energy source, and its impact is contingent on its interaction with your body’s intricate metabolic processes.    Rather than counting calories, it would be more beneficial to consider the whole nutritional profile of foods, choosing those that offer more fibres, proteins, and healthy fats, which are known to have positive effects on satiety, blood sugar levels, and overall health.

## 2025-07-18 NOTE — ASSESSMENT & PLAN NOTE
Elevated liver enzymes postpartum-- 6/8/23- persistently elevated;  No Alcohol or hepatotoxic meds;  hasn't had complete W/U.  ABD US done, with Fatty Liver; normal GB;  Lipids had been normal.    with confirmed fatty liver. Differential includes autoimmune contribution vs NAFLD.   - Labs as ordered.   - Repeat abdominal ultrasound.  - Refer to [facility] for evaluation.  - Follow up in one month for lab and ultrasound review.

## 2025-07-18 NOTE — ASSESSMENT & PLAN NOTE
Elevated liver enzymes postpartum-- 6/8/23- persistently elevated- until most recent labs- Anti-SMAb elevated, GGT up, RUQ US with Fatty Liver.  Persistent GGT elevation, unclear etiology, possible autoimmune hepatitis. Family history of fatty liver. Abstains from alcohol and processed foods.  - Order repeat CMP and GGT before GI appointment.  - Order repeat anti-smooth muscle antibody test.  - Refer to GI for further evaluation.  Orders:    Actin (Smooth Muscle) Antibody; Future; Expected date: 07/18/2025    GGT (Gamma Glutamyl Transpeptidase); Future; Expected date: 07/18/2025    Comp Metabolic Panel (14); Future; Expected date: 07/18/2025    Semaglutide-Weight Management; Inject 0.5 mL (0.25 mg total) into the skin once a week for 8 doses.  Dispense: 2 mL; Refill: 1

## 2025-07-18 NOTE — ASSESSMENT & PLAN NOTE
Pt is a candidate for medication assisted weight loss therapy based on BMI and Comorbidities.  (BMI >30 with [id number], NAFLD,).   Brenda has been on weight loss regimen of low-calorie diet, increased physical activity, and behavior modifications for >6 mos and it will be continued while patient is on the medication.    - Bloodwork- up-to-date   - start Wegovy 0.25mg   - Discussed MOA of the RX and possible AE-not limited to N/V/abdominal pain/pancreatitis and pt verbalized understanding  - Denies any PMH/FMH of pancreatitis, pancreatic cancer, thyroid cancer, MEN2   - Comprehensive Lifestyle Intervention to include: Diet, Exercise, Behavioral Modification  - Ensure 8hr of sleep/night and work on stress reduction.    - Discussion about Intermittent Fasting--and Glucose Revolution [employer]s (protein/fat 1st, no naked carbs, food order)  - Focus on meeting protein goals-- discussed lean protein options   - Maximize Water intake   - >150m of vigorous activity.  Ideally Strength Training- (still get benefit if Weekend Warrior)    - F/U in 2 mos     Orders:    Semaglutide-Weight Management; Inject 0.5 mL (0.25 mg total) into the skin once a week for 8 doses.  Dispense: 2 mL; Refill: 1

## 2025-07-18 NOTE — ASSESSMENT & PLAN NOTE
Concern about future pregnancies, potential autoimmune component. Discussed weight loss benefits and baby aspirin- may be worthwhile to look into.

## 2025-07-20 NOTE — TELEPHONE ENCOUNTER
Approved    Prior authorization approved  Payer: [employer] Case ID: [id number]    [phone]    [phone]  Note from payer: CaseId:[id number];Status:Approved;Review Type:Prior Auth;Coverage Start Date:06/18/2025;Coverage End Date:02/14/2026;  Approval Details    Authorized from June 18, 2025 to February 14, 2026

## 2025-07-30 ENCOUNTER — PATIENT MESSAGE (OUTPATIENT)
Dept: FAMILY MEDICINE CLINIC | Facility: CLINIC | Age: 35
End: 2025-07-30

## 2025-08-14 ENCOUNTER — LAB ENCOUNTER (OUTPATIENT)
Dept: LAB | Age: 35
End: 2025-08-14
Attending: FAMILY MEDICINE

## 2025-08-14 DIAGNOSIS — R74.8 ELEVATED LIVER ENZYMES: ICD-10-CM

## 2025-08-14 LAB
ALBUMIN SERPL-MCNC: 4.8 G/DL (ref 3.2–4.8)
ALBUMIN/GLOB SERPL: 1.8 (ref 1–2)
ALP LIVER SERPL-CCNC: 80 U/L (ref 37–98)
ALT SERPL-CCNC: 53 U/L (ref 10–49)
ANION GAP SERPL CALC-SCNC: 11 MMOL/L (ref 0–18)
AST SERPL-CCNC: 32 U/L (ref ?–34)
BILIRUB SERPL-MCNC: 0.3 MG/DL (ref 0.3–1.2)
BUN BLD-MCNC: 10 MG/DL (ref 9–23)
CALCIUM BLD-MCNC: 10.2 MG/DL (ref 8.7–10.6)
CHLORIDE SERPL-SCNC: 104 MMOL/L (ref 98–112)
CO2 SERPL-SCNC: 28 MMOL/L (ref 21–32)
CREAT BLD-MCNC: 0.8 MG/DL (ref 0.55–1.02)
EGFRCR SERPLBLD CKD-EPI 2021: 99 ML/MIN/1.73M2 (ref 60–?)
FASTING STATUS PATIENT QL REPORTED: NO
GGT SERPL-CCNC: 88 U/L (ref ?–38)
GLOBULIN PLAS-MCNC: 2.6 G/DL (ref 2–3.5)
GLUCOSE BLD-MCNC: 94 MG/DL (ref 70–99)
OSMOLALITY SERPL CALC.SUM OF ELEC: 295 MOSM/KG (ref 275–295)
POTASSIUM SERPL-SCNC: 4.1 MMOL/L (ref 3.5–5.1)
PROT SERPL-MCNC: 7.4 G/DL (ref 5.7–8.2)
SODIUM SERPL-SCNC: 143 MMOL/L (ref 136–145)

## 2025-08-14 PROCEDURE — 82977 ASSAY OF GGT: CPT

## 2025-08-14 PROCEDURE — 80053 COMPREHEN METABOLIC PANEL: CPT

## 2025-08-14 PROCEDURE — 36415 COLL VENOUS BLD VENIPUNCTURE: CPT

## 2025-08-14 PROCEDURE — 83516 IMMUNOASSAY NONANTIBODY: CPT

## 2025-08-15 LAB — ACTIN SMOOTH MUSCLE AB: 20 UNITS

## (undated) DIAGNOSIS — Z51.81 THERAPEUTIC DRUG MONITORING: ICD-10-CM

## (undated) DEVICE — LARGE, DISPOSABLE ALEXIS O C-SECTION PROTECTOR - RETRACTOR: Brand: ALEXIS ® O C-SECTION PROTECTOR - RETRACTOR

## (undated) NOTE — LETTER
January 25, 2025    Patient: Taylor Lynn Bonnevier   Date of Visit: 1/25/2025       To Whom It May Concern:    Taylor Bonnevier was seen and treated in our emergency department on 1/25/2025. She can return to work Monday, January 26, 2025.    If you have any questions or concerns, please don't hesitate to call.       Encounter Provider(s):    Sushma Molina MD